# Patient Record
Sex: FEMALE | Race: BLACK OR AFRICAN AMERICAN | Employment: FULL TIME
[De-identification: names, ages, dates, MRNs, and addresses within clinical notes are randomized per-mention and may not be internally consistent; named-entity substitution may affect disease eponyms.]

---

## 2017-01-07 PROBLEM — J03.90 ACUTE TONSILLITIS: Status: ACTIVE | Noted: 2017-01-07

## 2017-01-07 PROBLEM — A41.9 SEPSIS (HCC): Status: ACTIVE | Noted: 2017-01-07

## 2017-01-09 PROBLEM — R65.10 SIRS (SYSTEMIC INFLAMMATORY RESPONSE SYNDROME) (HCC): Status: ACTIVE | Noted: 2017-01-07

## 2017-07-24 ENCOUNTER — TELEPHONE (OUTPATIENT)
Dept: CASE MANAGEMENT | Age: 57
End: 2017-07-24

## 2019-04-17 LAB
A/G RATIO: 1.3 (ref 1.1–2.2)
ALBUMIN SERPL-MCNC: 4.4 G/DL (ref 3.4–5)
ALP BLD-CCNC: 93 U/L (ref 40–129)
ALT SERPL-CCNC: 16 U/L (ref 10–40)
ANION GAP SERPL CALCULATED.3IONS-SCNC: 13 MMOL/L (ref 3–16)
AST SERPL-CCNC: 14 U/L (ref 15–37)
BILIRUB SERPL-MCNC: 0.7 MG/DL (ref 0–1)
BUN BLDV-MCNC: 17 MG/DL (ref 7–20)
C-REACTIVE PROTEIN: 16.6 MG/L (ref 0–5.1)
CALCIUM SERPL-MCNC: 9.8 MG/DL (ref 8.3–10.6)
CHLORIDE BLD-SCNC: 106 MMOL/L (ref 99–110)
CO2: 24 MMOL/L (ref 21–32)
CREAT SERPL-MCNC: 0.9 MG/DL (ref 0.6–1.1)
FERRITIN: 35.9 NG/ML (ref 15–150)
GFR AFRICAN AMERICAN: >60
GFR NON-AFRICAN AMERICAN: >60
GLOBULIN: 3.5 G/DL
GLUCOSE BLD-MCNC: 104 MG/DL (ref 70–99)
HCT VFR BLD CALC: 31.7 % (ref 36–48)
HEMOGLOBIN: 10.5 G/DL (ref 12–16)
IRON: 50 UG/DL (ref 37–145)
LIPASE: 21 U/L (ref 13–60)
MCH RBC QN AUTO: 25.8 PG (ref 26–34)
MCHC RBC AUTO-ENTMCNC: 33.2 G/DL (ref 31–36)
MCV RBC AUTO: 77.5 FL (ref 80–100)
PDW BLD-RTO: 15.5 % (ref 12.4–15.4)
PLATELET # BLD: 273 K/UL (ref 135–450)
PMV BLD AUTO: 9.3 FL (ref 5–10.5)
POTASSIUM SERPL-SCNC: 3.7 MMOL/L (ref 3.5–5.1)
RBC # BLD: 4.09 M/UL (ref 4–5.2)
SODIUM BLD-SCNC: 143 MMOL/L (ref 136–145)
TOTAL PROTEIN: 7.9 G/DL (ref 6.4–8.2)
TRANSFERRIN: 283 MG/DL (ref 200–360)
WBC # BLD: 6.5 K/UL (ref 4–11)

## 2019-04-23 LAB — HEMOGLOBIN ELECTROPHORESIS: NORMAL

## 2019-04-25 LAB — HGB ELECTROPHORESIS INTERP: NORMAL

## 2019-05-08 ENCOUNTER — ANESTHESIA EVENT (OUTPATIENT)
Dept: ENDOSCOPY | Age: 59
End: 2019-05-08
Payer: COMMERCIAL

## 2019-05-08 ENCOUNTER — HOSPITAL ENCOUNTER (OUTPATIENT)
Age: 59
Setting detail: OUTPATIENT SURGERY
Discharge: HOME | End: 2019-05-08
Attending: INTERNAL MEDICINE | Admitting: INTERNAL MEDICINE
Payer: COMMERCIAL

## 2019-05-08 ENCOUNTER — ANESTHESIA (OUTPATIENT)
Dept: ENDOSCOPY | Age: 59
End: 2019-05-08
Payer: COMMERCIAL

## 2019-05-08 VITALS
WEIGHT: 232.25 LBS | HEIGHT: 64 IN | OXYGEN SATURATION: 100 % | TEMPERATURE: 97.5 F | DIASTOLIC BLOOD PRESSURE: 79 MMHG | HEART RATE: 74 BPM | SYSTOLIC BLOOD PRESSURE: 135 MMHG | BODY MASS INDEX: 39.65 KG/M2 | RESPIRATION RATE: 18 BRPM

## 2019-05-08 VITALS
RESPIRATION RATE: 14 BRPM | OXYGEN SATURATION: 99 % | DIASTOLIC BLOOD PRESSURE: 78 MMHG | SYSTOLIC BLOOD PRESSURE: 123 MMHG

## 2019-05-08 DIAGNOSIS — D64.9 ANEMIA, UNSPECIFIED TYPE: ICD-10-CM

## 2019-05-08 PROCEDURE — 7100000011 HC PHASE II RECOVERY - ADDTL 15 MIN: Performed by: INTERNAL MEDICINE

## 2019-05-08 PROCEDURE — 3609012400 HC EGD TRANSORAL BIOPSY SINGLE/MULTIPLE: Performed by: INTERNAL MEDICINE

## 2019-05-08 PROCEDURE — 2580000003 HC RX 258: Performed by: ANESTHESIOLOGY

## 2019-05-08 PROCEDURE — 3700000001 HC ADD 15 MINUTES (ANESTHESIA): Performed by: INTERNAL MEDICINE

## 2019-05-08 PROCEDURE — 6360000002 HC RX W HCPCS: Performed by: NURSE ANESTHETIST, CERTIFIED REGISTERED

## 2019-05-08 PROCEDURE — 7100000010 HC PHASE II RECOVERY - FIRST 15 MIN: Performed by: INTERNAL MEDICINE

## 2019-05-08 PROCEDURE — 6360000002 HC RX W HCPCS: Performed by: ANESTHESIOLOGY

## 2019-05-08 PROCEDURE — 3700000000 HC ANESTHESIA ATTENDED CARE: Performed by: INTERNAL MEDICINE

## 2019-05-08 PROCEDURE — 2500000003 HC RX 250 WO HCPCS: Performed by: NURSE ANESTHETIST, CERTIFIED REGISTERED

## 2019-05-08 PROCEDURE — 88305 TISSUE EXAM BY PATHOLOGIST: CPT

## 2019-05-08 PROCEDURE — 2709999900 HC NON-CHARGEABLE SUPPLY: Performed by: INTERNAL MEDICINE

## 2019-05-08 PROCEDURE — 3609008900 HC SIGMOIDOSCOPY POLYP SNARE: Performed by: INTERNAL MEDICINE

## 2019-05-08 RX ORDER — SODIUM CHLORIDE 9 MG/ML
INJECTION, SOLUTION INTRAVENOUS CONTINUOUS
Status: DISCONTINUED | OUTPATIENT
Start: 2019-05-08 | End: 2019-05-08 | Stop reason: HOSPADM

## 2019-05-08 RX ORDER — FERROUS SULFATE 325(65) MG
325 TABLET ORAL 2 TIMES DAILY
Qty: 60 TABLET | Refills: 5
Start: 2019-05-08

## 2019-05-08 RX ORDER — SODIUM CHLORIDE 0.9 % (FLUSH) 0.9 %
10 SYRINGE (ML) INJECTION PRN
Status: DISCONTINUED | OUTPATIENT
Start: 2019-05-08 | End: 2019-05-08 | Stop reason: HOSPADM

## 2019-05-08 RX ORDER — MEPERIDINE HYDROCHLORIDE 25 MG/ML
12.5 INJECTION INTRAMUSCULAR; INTRAVENOUS; SUBCUTANEOUS EVERY 5 MIN PRN
Status: DISCONTINUED | OUTPATIENT
Start: 2019-05-08 | End: 2019-05-08 | Stop reason: HOSPADM

## 2019-05-08 RX ORDER — SODIUM CHLORIDE 0.9 % (FLUSH) 0.9 %
10 SYRINGE (ML) INJECTION EVERY 12 HOURS SCHEDULED
Status: DISCONTINUED | OUTPATIENT
Start: 2019-05-08 | End: 2019-05-08 | Stop reason: HOSPADM

## 2019-05-08 RX ORDER — ONDANSETRON 2 MG/ML
4 INJECTION INTRAMUSCULAR; INTRAVENOUS ONCE
Status: COMPLETED | OUTPATIENT
Start: 2019-05-08 | End: 2019-05-08

## 2019-05-08 RX ORDER — GLYCOPYRROLATE 0.2 MG/ML
INJECTION INTRAMUSCULAR; INTRAVENOUS PRN
Status: DISCONTINUED | OUTPATIENT
Start: 2019-05-08 | End: 2019-05-08 | Stop reason: SDUPTHER

## 2019-05-08 RX ORDER — PROPOFOL 10 MG/ML
INJECTION, EMULSION INTRAVENOUS PRN
Status: DISCONTINUED | OUTPATIENT
Start: 2019-05-08 | End: 2019-05-08 | Stop reason: SDUPTHER

## 2019-05-08 RX ORDER — OXYCODONE HYDROCHLORIDE 5 MG/1
5 TABLET ORAL
Status: DISCONTINUED | OUTPATIENT
Start: 2019-05-08 | End: 2019-05-08 | Stop reason: HOSPADM

## 2019-05-08 RX ORDER — PROMETHAZINE HYDROCHLORIDE 25 MG/ML
6.25 INJECTION, SOLUTION INTRAMUSCULAR; INTRAVENOUS
Status: DISCONTINUED | OUTPATIENT
Start: 2019-05-08 | End: 2019-05-08 | Stop reason: HOSPADM

## 2019-05-08 RX ORDER — LIDOCAINE HYDROCHLORIDE 20 MG/ML
INJECTION, SOLUTION EPIDURAL; INFILTRATION; INTRACAUDAL; PERINEURAL PRN
Status: DISCONTINUED | OUTPATIENT
Start: 2019-05-08 | End: 2019-05-08 | Stop reason: SDUPTHER

## 2019-05-08 RX ORDER — HYDRALAZINE HYDROCHLORIDE 20 MG/ML
10 INJECTION INTRAMUSCULAR; INTRAVENOUS EVERY 10 MIN PRN
Status: DISCONTINUED | OUTPATIENT
Start: 2019-05-08 | End: 2019-05-08 | Stop reason: HOSPADM

## 2019-05-08 RX ORDER — MORPHINE SULFATE 4 MG/ML
3 INJECTION, SOLUTION INTRAMUSCULAR; INTRAVENOUS
Status: ACTIVE | OUTPATIENT
Start: 2019-05-08 | End: 2019-05-08

## 2019-05-08 RX ADMIN — SODIUM CHLORIDE: 0.9 INJECTION, SOLUTION INTRAVENOUS at 11:33

## 2019-05-08 RX ADMIN — PROPOFOL 100 MG: 10 INJECTION, EMULSION INTRAVENOUS at 12:02

## 2019-05-08 RX ADMIN — LIDOCAINE HYDROCHLORIDE 25 MG: 20 INJECTION, SOLUTION EPIDURAL; INFILTRATION; INTRACAUDAL; PERINEURAL at 12:08

## 2019-05-08 RX ADMIN — PROPOFOL 50 MG: 10 INJECTION, EMULSION INTRAVENOUS at 12:27

## 2019-05-08 RX ADMIN — GLYCOPYRROLATE 0.2 MG: 0.2 INJECTION, SOLUTION INTRAMUSCULAR; INTRAVENOUS at 11:59

## 2019-05-08 RX ADMIN — PROPOFOL 50 MG: 10 INJECTION, EMULSION INTRAVENOUS at 12:21

## 2019-05-08 RX ADMIN — PROPOFOL 50 MG: 10 INJECTION, EMULSION INTRAVENOUS at 12:05

## 2019-05-08 RX ADMIN — PROPOFOL 50 MG: 10 INJECTION, EMULSION INTRAVENOUS at 12:33

## 2019-05-08 RX ADMIN — ONDANSETRON 4 MG: 2 INJECTION INTRAMUSCULAR; INTRAVENOUS at 11:46

## 2019-05-08 RX ADMIN — PROPOFOL 50 MG: 10 INJECTION, EMULSION INTRAVENOUS at 12:15

## 2019-05-08 RX ADMIN — PROPOFOL 20 MG: 10 INJECTION, EMULSION INTRAVENOUS at 12:12

## 2019-05-08 RX ADMIN — PROPOFOL 50 MG: 10 INJECTION, EMULSION INTRAVENOUS at 12:08

## 2019-05-08 RX ADMIN — LIDOCAINE HYDROCHLORIDE 50 MG: 20 INJECTION, SOLUTION EPIDURAL; INFILTRATION; INTRACAUDAL; PERINEURAL at 12:02

## 2019-05-08 RX ADMIN — PROPOFOL 50 MG: 10 INJECTION, EMULSION INTRAVENOUS at 12:24

## 2019-05-08 RX ADMIN — PROPOFOL 50 MG: 10 INJECTION, EMULSION INTRAVENOUS at 12:18

## 2019-05-08 RX ADMIN — LIDOCAINE HYDROCHLORIDE 25 MG: 20 INJECTION, SOLUTION EPIDURAL; INFILTRATION; INTRACAUDAL; PERINEURAL at 12:05

## 2019-05-08 ASSESSMENT — PAIN DESCRIPTION - LOCATION: LOCATION: ABDOMEN

## 2019-05-08 ASSESSMENT — ENCOUNTER SYMPTOMS: SHORTNESS OF BREATH: 0

## 2019-05-08 ASSESSMENT — PAIN - FUNCTIONAL ASSESSMENT
PAIN_FUNCTIONAL_ASSESSMENT: 0-10
PAIN_FUNCTIONAL_ASSESSMENT: PREVENTS OR INTERFERES SOME ACTIVE ACTIVITIES AND ADLS

## 2019-05-08 ASSESSMENT — LIFESTYLE VARIABLES: SMOKING_STATUS: 0

## 2019-05-08 ASSESSMENT — PAIN SCALES - GENERAL
PAINLEVEL_OUTOF10: 1
PAINLEVEL_OUTOF10: 0
PAINLEVEL_OUTOF10: 1

## 2019-05-08 ASSESSMENT — PAIN DESCRIPTION - PAIN TYPE: TYPE: ACUTE PAIN

## 2019-05-08 ASSESSMENT — PAIN DESCRIPTION - DESCRIPTORS: DESCRIPTORS: CRAMPING

## 2019-05-08 ASSESSMENT — PAIN DESCRIPTION - FREQUENCY: FREQUENCY: INTERMITTENT

## 2019-05-08 NOTE — H&P
Pre-operative History and Physical    Patient: Orly Burton  : 1960  Acct#:     Intended Procedure:  EGD and sigmoidoscopy    HISTORY OF PRESENT ILLNESS:  The patient is a 61 y.o. female  who presents for/due to iron deficiency anemia and rectal bleeding. Past Medical History:        Diagnosis Date    Asthma     Blood transfusion reaction     GERD (gastroesophageal reflux disease)     Headache(784.0)     HTN (hypertension), benign 5/3/2010    Hyperlipidemia     Major depression in remission ([facility] [address] 5/3/2010    Rectal bleeding      Past Surgical History:        Procedure Laterality Date    CHOLECYSTECTOMY      COLONOSCOPY      HYSTERECTOMY      SHOULDER SURGERY      2 on right 1 on left     Medications Prior to Admission:   No current facility-administered medications on file prior to encounter. Current Outpatient Medications on File Prior to Encounter   Medication Sig Dispense Refill    Probiotic Product (ALIGN PO) Take by mouth      budesonide-formoterol (SYMBICORT) 160-4.5 MCG/ACT AERO Inhale 2 puffs into the lungs 2 times daily. 1 Inhaler 3        Allergies:  Aspirin    Social History:   TOBACCO:   reports that she quit smoking about 6 years ago. Her smoking use included cigarettes. She has a 3.00 pack-year smoking history. She has never used smokeless tobacco.  ETOH:   reports that she drinks alcohol. DRUGS:   reports that she does not use drugs. PHYSICAL EXAM:      Vital Signs: BP (!) 152/82   Pulse 83   Temp 97.5 °F (36.4 °C) (Temporal)   Resp 16   Ht 5' 4\" (1.626 m)   Wt 232 lb 4 oz (105.3 kg)   SpO2 98%   BMI 39.87 kg/m²    Airway: No stridor or wheezing noted. Good air movement  Pulmonary: without wheezes.   Clear to auscultation  Cardiac:regular rate and rhythm without loud murmurs  Abdomen:soft, nontender,  Bowel sounds present    Pre-Procedure Assessment / Plan:  1) Iron deficiency anemia  2) Chronic diarrhea    ASA Grade:  ASA 3 - Patient with moderate systemic disease with functional limitations  Mallampati Classification:  Class II    Level of Sedation Plan:Deep sedation    Post Procedure plan: Return to same level of care    I assessed the patient and find that the patient is in satisfactory condition to proceed with the planned procedure and sedation plan. I have explained the risk, benefits, and alternatives to the procedure; the patient understands and agrees to proceed.        Kelby Leoncio, DO  5/8/2019

## 2019-05-08 NOTE — OP NOTE
Endoscopy Note    Patient: Tania Mcgowan  : 1960  Acct#:     Procedure: Esophagogastroduodenoscopy with biopsy                       Sigmoidoscopy with biopsy, tattoo    Date:  2019     Endoscopist:   Cherelle Cordero DO    Referring Physician:  Shannon Altamirano MD    Indications: This is a 61y.o. year old female who presents today with intermittent diarrhea and constipation, with iron deficiency anemia and [place] rectal bleeding. She has a prior history of pedunculated lipomatous appearing polyp in  and . This was previously biopsied in  and was benign. Previous Colonoscopy: YES  Date:   Greater than 3 years: N/A    Postoperative Diagnosis:  1) The Z line was at 39 cm from the incisors. The patient was noted to have a 3mm tongue of columnar epithelium lining the distal esophagus. This was closely examined with both white light and narrow band imaging. This was suspicious for Norman's esophagus. This was biopsied. No nodules or raised lesions were noted. 2) No reflux esophagitis was noted. 3) A small 5mm gastric inlet patch was noted in the proximal esophagus. 4) A small 2cm sliding hiatal hernia was noted. 5) There was a moderate amount of bile stained liquid noted in the stomach with reactive mucosal changes noted. The antrum of the stomach was biopsied. 6) The villous pattern appeared normal, but given symptoms, multiple small bowel biopsies were obtained to evaluate for celiac disease. 7) The ampulla was normal appearing. 8) There was a large 5-7 cm pedunculated polypoid lesion in the sigmoid colon. This was from 28-35 cm from the anal verge. This appeared to be somewhat necrotic on the distal aspect of the polyp. This was in the location of the previously identified suspected lipoma. This lesion was soft and friable with near total complete luminal obstruction.   This mass appeared to be friable and irritated with possible

## 2019-05-08 NOTE — ANESTHESIA PRE PROCEDURE
SURGERY      2 on right 1 on left       Social History:    Social History     Tobacco Use    Smoking status: Former Smoker     Packs/day: 0.10     Years: 30.00     Pack years: 3.00     Types: Cigarettes     Last attempt to quit: 3/12/2013     Years since quittin.1    Smokeless tobacco: Never Used    Tobacco comment: would like to try chantix   Substance Use Topics    Alcohol use: Yes     Comment: less than 1/week- Absolute                                Counseling given: Not Answered  Comment: would like to try chantix      Vital Signs (Current):   Vitals:    19 1547 19 1128   BP:  (!) 152/82   Pulse:  83   Resp:  16   Temp:  97.5 °F (36.4 °C)   TempSrc:  Temporal   SpO2:  98%   Weight: 240 lb (108.9 kg) 232 lb 4 oz (105.3 kg)   Height: 5' 4\" (1.626 m)                                               BP Readings from Last 3 Encounters:   19 (!) 152/82   01/10/17 (!) 161/85   14 126/76       NPO Status: Time of last liquid consumption: 1800                        Time of last solid consumption: 1800                        Date of last liquid consumption: 19                        Date of last solid food consumption: 19    BMI:   Wt Readings from Last 3 Encounters:   19 232 lb 4 oz (105.3 kg)   01/10/17 225 lb 14.4 oz (102.5 kg)   14 222 lb 9.6 oz (101 kg)     Body mass index is 39.87 kg/m².     CBC:   Lab Results   Component Value Date    WBC 6.5 2019    RBC 4.09 2019    HGB 10.5 2019    HCT 31.7 2019    MCV 77.5 2019    RDW 15.5 2019     2019       CMP:   Lab Results   Component Value Date     2019    K 3.7 2019     2019    CO2 24 2019    BUN 17 2019    CREATININE 0.9 2019    GFRAA >60 2019    GFRAA >60 2012    AGRATIO 1.3 2019    LABGLOM >60 2019    LABGLOM >60 2010    GLUCOSE 104 2019    PROT 7.9 2019    PROT 8.2 10/05/2012 CALCIUM 9.8 04/17/2019    BILITOT 0.7 04/17/2019    ALKPHOS 93 04/17/2019    AST 14 04/17/2019    ALT 16 04/17/2019       POC Tests: No results for input(s): POCGLU, POCNA, POCK, POCCL, POCBUN, POCHEMO, POCHCT in the last 72 hours. Coags:   Lab Results   Component Value Date    PROTIME 10.6 10/01/2012    INR 0.93 10/01/2012    APTT 31.6 10/01/2012       HCG (If Applicable): No results found for: PREGTESTUR, PREGSERUM, HCG, HCGQUANT     ABGs: No results found for: PHART, PO2ART, WMO7SVO, VWA9YOZ, BEART, U2PTZGAQ     Type & Screen (If Applicable):  No results found for: LABABO, [address]    Anesthesia Evaluation  Patient summary reviewed   history of anesthetic complications: PONV. Airway: Mallampati: II  TM distance: >3 FB     Mouth opening: > = 3 FB Dental: normal exam         Pulmonary: breath sounds clear to auscultation  (+) asthma (last MDI use 6 mo ago):     (-) shortness of breath, recent URI and not a current smoker          Patient did not smoke on day of surgery. Cardiovascular:  Exercise tolerance: good (>4 METS),   (+) hypertension (no meds DOS):,     (-) pacemaker, past MI, CABG/stent,  angina and murmur      Rhythm: regular  Rate: normal                    Neuro/Psych:               GI/Hepatic/Renal:   (+) GERD:,      (-) no renal disease       Endo/Other:        (-) diabetes mellitus, hypothyroidism               Abdominal:           Vascular:                                        Anesthesia Plan      TIVA     ASA 2       Induction: intravenous. Anesthetic plan and risks discussed with patient. Plan discussed with CRNA.                   Maryhelen Hammans, MD   5/8/2019

## 2019-05-08 NOTE — PROGRESS NOTES
5 mLSPOT ink injection at polyp at 35 cm per MD   SPOT lot [id number]  Exp 04/26/2020
you have a living will and a durable power of  for healthcare, please bring in a copy. As part of our patient safety program to minimize surgical site infections, we ask you to do the following:    · Please notify your surgeon if you develop any illness between         now and the  day of your surgery. · This includes a cough, cold, fever, sore throat, nausea,         or vomiting, and diarrhea, etc.  ·  Please notify your surgeon if you experience dizziness, shortness         of breath or blurred vision between now and the time of your surgery. You may shower the night before surgery or the morning of   your surgery with an antibacterial soap. You will need to bring a photo ID and insurance card    [facility] has an onsite pharmacy, would you like to utilize our pharmacy     If you will be staying overnight and use a C-pap machine, please bring   your C-pap to hospital     Our goal is to provide you with excellent care, therefore, visitors will be limited to two(2) in the room at a time so that we may focus on providing this care for you. Please contact pre-admission testing if you have any further questions. [facility] phone number:  [phone]  Please note these are generalized instructions for all surgical cases, you may be provided with more specific instructions according to your surgery.

## 2022-07-04 ENCOUNTER — HOSPITAL ENCOUNTER (EMERGENCY)
Age: 62
Discharge: HOME | End: 2022-07-04
Attending: EMERGENCY MEDICINE
Payer: COMMERCIAL

## 2022-07-04 ENCOUNTER — APPOINTMENT (OUTPATIENT)
Dept: GENERAL RADIOLOGY | Age: 62
End: 2022-07-04
Payer: COMMERCIAL

## 2022-07-04 ENCOUNTER — APPOINTMENT (OUTPATIENT)
Dept: CT IMAGING | Age: 62
End: 2022-07-04
Payer: COMMERCIAL

## 2022-07-04 VITALS
WEIGHT: 250 LBS | HEIGHT: 64 IN | BODY MASS INDEX: 42.68 KG/M2 | RESPIRATION RATE: 16 BRPM | OXYGEN SATURATION: 95 % | SYSTOLIC BLOOD PRESSURE: 116 MMHG | DIASTOLIC BLOOD PRESSURE: 64 MMHG | HEART RATE: 77 BPM | TEMPERATURE: 98.5 F

## 2022-07-04 DIAGNOSIS — Z20.822 CLOSE EXPOSURE TO COVID-19 VIRUS: Primary | ICD-10-CM

## 2022-07-04 DIAGNOSIS — J18.9 PNEUMONITIS: ICD-10-CM

## 2022-07-04 DIAGNOSIS — R91.1 LUNG NODULE: ICD-10-CM

## 2022-07-04 LAB
A/G RATIO: 1.4 (ref 1.1–2.2)
ALBUMIN SERPL-MCNC: 4.5 G/DL (ref 3.4–5)
ALP BLD-CCNC: 102 U/L (ref 40–129)
ALT SERPL-CCNC: 22 U/L (ref 10–40)
ANION GAP SERPL CALCULATED.3IONS-SCNC: 9 MMOL/L (ref 3–16)
AST SERPL-CCNC: 18 U/L (ref 15–37)
BASOPHILS ABSOLUTE: 0 K/UL (ref 0–0.2)
BASOPHILS RELATIVE PERCENT: 0.3 %
BILIRUB SERPL-MCNC: 0.9 MG/DL (ref 0–1)
BUN BLDV-MCNC: 15 MG/DL (ref 7–20)
CALCIUM SERPL-MCNC: 10.3 MG/DL (ref 8.3–10.6)
CHLORIDE BLD-SCNC: 107 MMOL/L (ref 99–110)
CO2: 25 MMOL/L (ref 21–32)
CREAT SERPL-MCNC: 0.7 MG/DL (ref 0.6–1.2)
D DIMER: 1.53 UG/ML FEU (ref 0–0.6)
EOSINOPHILS ABSOLUTE: 0 K/UL (ref 0–0.6)
EOSINOPHILS RELATIVE PERCENT: 0.6 %
GFR AFRICAN AMERICAN: >60
GFR NON-AFRICAN AMERICAN: >60
GLUCOSE BLD-MCNC: 115 MG/DL (ref 70–99)
HCT VFR BLD CALC: 34 % (ref 36–48)
HEMOGLOBIN: 11.1 G/DL (ref 12–16)
INFLUENZA A: NOT DETECTED
INFLUENZA B: NOT DETECTED
LYMPHOCYTES ABSOLUTE: 1.9 K/UL (ref 1–5.1)
LYMPHOCYTES RELATIVE PERCENT: 25.9 %
MCH RBC QN AUTO: 25.7 PG (ref 26–34)
MCHC RBC AUTO-ENTMCNC: 32.8 G/DL (ref 31–36)
MCV RBC AUTO: 78.4 FL (ref 80–100)
MONOCYTES ABSOLUTE: 0.5 K/UL (ref 0–1.3)
MONOCYTES RELATIVE PERCENT: 7.6 %
NEUTROPHILS ABSOLUTE: 4.7 K/UL (ref 1.7–7.7)
NEUTROPHILS RELATIVE PERCENT: 65.6 %
PDW BLD-RTO: 15.1 % (ref 12.4–15.4)
PLATELET # BLD: 218 K/UL (ref 135–450)
PMV BLD AUTO: 9.1 FL (ref 5–10.5)
POTASSIUM SERPL-SCNC: 4.2 MMOL/L (ref 3.5–5.1)
PRO-BNP: 19 PG/ML (ref 0–124)
RBC # BLD: 4.33 M/UL (ref 4–5.2)
SARS-COV-2 RNA, RT PCR: NOT DETECTED
SODIUM BLD-SCNC: 141 MMOL/L (ref 136–145)
TOTAL PROTEIN: 7.8 G/DL (ref 6.4–8.2)
TROPONIN: <0.01 NG/ML
WBC # BLD: 7.2 K/UL (ref 4–11)

## 2022-07-04 PROCEDURE — 87636 SARSCOV2 & INF A&B AMP PRB: CPT

## 2022-07-04 PROCEDURE — 85025 COMPLETE CBC W/AUTO DIFF WBC: CPT

## 2022-07-04 PROCEDURE — 94760 N-INVAS EAR/PLS OXIMETRY 1: CPT

## 2022-07-04 PROCEDURE — 85379 FIBRIN DEGRADATION QUANT: CPT

## 2022-07-04 PROCEDURE — 71046 X-RAY EXAM CHEST 2 VIEWS: CPT

## 2022-07-04 PROCEDURE — 6360000004 HC RX CONTRAST MEDICATION: Performed by: PHYSICIAN ASSISTANT

## 2022-07-04 PROCEDURE — 6370000000 HC RX 637 (ALT 250 FOR IP): Performed by: PHYSICIAN ASSISTANT

## 2022-07-04 PROCEDURE — 93005 ELECTROCARDIOGRAM TRACING: CPT | Performed by: PHYSICIAN ASSISTANT

## 2022-07-04 PROCEDURE — 94640 AIRWAY INHALATION TREATMENT: CPT

## 2022-07-04 PROCEDURE — 71260 CT THORAX DX C+: CPT | Performed by: PHYSICIAN ASSISTANT

## 2022-07-04 PROCEDURE — 80053 COMPREHEN METABOLIC PANEL: CPT

## 2022-07-04 PROCEDURE — 83880 ASSAY OF NATRIURETIC PEPTIDE: CPT

## 2022-07-04 PROCEDURE — 99285 EMERGENCY DEPT VISIT HI MDM: CPT

## 2022-07-04 PROCEDURE — 84484 ASSAY OF TROPONIN QUANT: CPT

## 2022-07-04 RX ORDER — DOXYCYCLINE 100 MG/1
100 TABLET ORAL 2 TIMES DAILY
Qty: 20 TABLET | Refills: 0 | Status: ON HOLD
Start: 2022-07-04 | End: 2022-07-11 | Stop reason: HOSPADM

## 2022-07-04 RX ORDER — IPRATROPIUM BROMIDE AND ALBUTEROL SULFATE 2.5; .5 MG/3ML; MG/3ML
1 SOLUTION RESPIRATORY (INHALATION) ONCE
Status: COMPLETED | OUTPATIENT
Start: 2022-07-04 | End: 2022-07-04

## 2022-07-04 RX ORDER — BENZONATATE 100 MG/1
100 CAPSULE ORAL 3 TIMES DAILY PRN
Qty: 30 CAPSULE | Refills: 0 | Status: SHIPPED | OUTPATIENT
Start: 2022-07-04 | End: 2022-07-11

## 2022-07-04 RX ORDER — ALBUTEROL SULFATE 2.5 MG/3ML
2.5 SOLUTION RESPIRATORY (INHALATION) EVERY 6 HOURS PRN
Qty: 120 EACH | Refills: 1 | Status: SHIPPED | OUTPATIENT
Start: 2022-07-04

## 2022-07-04 RX ADMIN — IOPAMIDOL 75 ML: 755 INJECTION, SOLUTION INTRAVENOUS at 12:22

## 2022-07-04 RX ADMIN — IPRATROPIUM BROMIDE AND ALBUTEROL SULFATE 1 AMPULE: .5; 3 SOLUTION RESPIRATORY (INHALATION) at 11:46

## 2022-07-04 ASSESSMENT — ENCOUNTER SYMPTOMS
STRIDOR: 0
WHEEZING: 0
NAUSEA: 0
TROUBLE SWALLOWING: 0
SORE THROAT: 1
COLOR CHANGE: 0
BACK PAIN: 0
VOICE CHANGE: 0
COUGH: 1
CONSTIPATION: 0
DIARRHEA: 0
SHORTNESS OF BREATH: 0
VOMITING: 0
ABDOMINAL PAIN: 0

## 2022-07-04 ASSESSMENT — PAIN - FUNCTIONAL ASSESSMENT: PAIN_FUNCTIONAL_ASSESSMENT: NONE - DENIES PAIN

## 2022-07-04 ASSESSMENT — LIFESTYLE VARIABLES
HOW MANY STANDARD DRINKS CONTAINING ALCOHOL DO YOU HAVE ON A TYPICAL DAY: 3 OR 4
HOW OFTEN DO YOU HAVE A DRINK CONTAINING ALCOHOL: 2-4 TIMES A MONTH

## 2022-07-04 NOTE — ED PROVIDER NOTES
This is an independent EDWIN patient encounter. I am available for consultation if needed. I did not perform a face-to-face evaluation of this patient and was not asked to see the patient. I was not made aware of any details of the patient's H&P or medical decision making but was asked to review and document this EKG. See my interpretation of the EKG below. I shared my findings and interpretation with the EDWIN for use in his/her independent management of this patient. See his/her note for details of the patient's history, physical, and all medical decision making.       The 12 lead EKG was interpreted by me as follows:  Rate: normal with a rate of 78  Rhythm: sinus  Axis: normal  Intervals: normal VA, narrow QRS, normal QTc  ST segments: no ST elevations or depressions  T waves: no abnormal inversions  Non-specific T wave changes: present   LVH changes  Prior EKG comparison: EKG dated 10/2/12 is not significantly different          Natalia Sanchez MD  07/04/22 1021

## 2022-07-04 NOTE — ED PROVIDER NOTES
[facility]        Pt Name: Vane Dasilva  MRN: [id number]  [place] 1960  Date of evaluation: 7/4/2022  Provider: Joss Aldrich PA-C  PCP: Ann Marie Rocha MD  Note Started: 10:56 AM EDT       EDWIN. I have evaluated this patient. My supervising physician was available for consultation. CHIEF COMPLAINT       Chief Complaint   Patient presents with    Cough     mucous with blood since thursday. feels achy  had fever       HISTORY OF PRESENT ILLNESS   (Location, Timing/Onset, Context/Setting, Quality, Duration, Modifying Factors, Severity, Associated Signs and Symptoms)  Note limiting factors. Chief Complaint: Cough, congestion, fever, chills    Vane Dasilva is a 58 y.o. female who presents to the emergency department complaining of cough, congestion, fever and chills starting on Thursday. Her  is being seen in the emergency department for similar presentation. They recently got back from [place] to visit with son, who renewed his wedding vows. The patient is coughing up frothy sputum but does occasionally report blood streaks. Denies chest pain or shortness of breath. She is wearing a heart monitor in preparation for right knee surgery on 7/18/2022. She is not experiencing any palpitations at this time. She had COVID in 2020 and had some lung and heart complications from this illness. She reports sore throat when she coughs. Nursing Notes were all reviewed and agreed with or any disagreements were addressed in the HPI. REVIEW OF SYSTEMS    (2-9 systems for level 4, 10 or more for level 5)     Review of Systems   Constitutional: Positive for chills, fatigue and fever. HENT: Positive for congestion and sore throat. Negative for dental problem, drooling, ear discharge, ear pain, tinnitus, trouble swallowing and voice change. Eyes: Negative for visual disturbance.    Respiratory: Positive for cough. Negative for shortness of breath, wheezing and stridor. Cardiovascular: Negative for chest pain, palpitations and leg swelling. Gastrointestinal: Negative for abdominal pain, constipation, diarrhea, nausea and vomiting. Genitourinary: Negative. Musculoskeletal: Positive for myalgias. Negative for back pain, neck pain and neck stiffness. Skin: Negative for color change, pallor, rash and wound. Neurological: Negative for dizziness, tremors, seizures, syncope, facial asymmetry, speech difficulty, weakness, light-headedness, numbness and headaches. Psychiatric/Behavioral: Negative for confusion. All other systems reviewed and are negative. Positives and Pertinent negatives as per HPI. Except as noted above in the ROS, all other systems were reviewed and negative. PAST MEDICAL HISTORY     Past Medical History:   Diagnosis Date    Asthma     Blood transfusion reaction     GERD (gastroesophageal reflux disease)     Headache(784.0)     HTN (hypertension), benign 5/3/2010    Hyperlipidemia     Major depression in remission ([facility] [address] 5/3/2010    Rectal bleeding          SURGICAL HISTORY     Past Surgical History:   Procedure Laterality Date    CHOLECYSTECTOMY      COLONOSCOPY      HYSTERECTOMY (CERVIX STATUS UNKNOWN)      SHOULDER SURGERY      2 on right 1 on left    SIGMOIDOSCOPY N/A 5/8/2019    SIGMOIDOSCOPY POLYP SNARE performed by Dylon Adhikari., DO at [address] [place] [address] N/A 5/8/2019    EGD BIOPSY performed by Dylon Viveros DO at [place] [address]       Previous Medications    BUDESONIDE-FORMOTEROL (SYMBICORT) 160-4.5 MCG/ACT AERO    Inhale 2 puffs into the lungs 2 times daily.     FERROUS SULFATE 325 (65 FE) MG TABLET    Take 1 tablet by mouth 2 times daily    PROBIOTIC PRODUCT (ALIGN PO)    Take by mouth         ALLERGIES     Aspirin    FAMILYHISTORY       Family History   Problem Conjunctiva/sclera: Conjunctivae normal.      Pupils: Pupils are equal, round, and reactive to light. Neck:      Trachea: Trachea and phonation normal.      Meningeal: Brudzinski's sign and Kernig's sign absent. Cardiovascular:      Rate and Rhythm: Normal rate. Pulmonary:      Effort: Pulmonary effort is normal.      Breath sounds: Normal breath sounds. Abdominal:      General: Bowel sounds are normal.      Palpations: Abdomen is soft. Tenderness: There is no abdominal tenderness. There is no right CVA tenderness or left CVA tenderness. Musculoskeletal:         General: Normal range of motion. Cervical back: Full passive range of motion without pain, normal range of motion and neck supple. Comments: No extremity edema, posterior calf or thigh tenderness, palpable cord, discoloration. Negative Homans   Lymphadenopathy:      Cervical: No cervical adenopathy. Skin:     General: Skin is warm and dry. Capillary Refill: Capillary refill takes less than 2 seconds. Coloration: Skin is not jaundiced or pale. Findings: No bruising, erythema, lesion or rash. Neurological:      General: No focal deficit present. Mental Status: She is alert and oriented to person, place, and time.    Psychiatric:         Mood and Affect: Mood normal.         Behavior: Behavior normal.         DIAGNOSTIC RESULTS   LABS:    Labs Reviewed   D-DIMER, QUANTITATIVE - Abnormal; Notable for the following components:       Result Value    D-Dimer, Quant 1.53 (*)     All other components within normal limits   CBC WITH AUTO DIFFERENTIAL - Abnormal; Notable for the following components:    Hemoglobin 11.1 (*)     Hematocrit 34.0 (*)     MCV 78.4 (*)     MCH 25.7 (*)     All other components within normal limits   COMPREHENSIVE METABOLIC PANEL - Abnormal; Notable for the following components:    Glucose 115 (*)     All other components within normal limits   COVID-19 & INFLUENZA COMBO   TROPONIN   BRAIN NATRIURETIC PEPTIDE       When ordered only abnormal lab results are displayed. All other labs were within normal range or not returned as of this dictation. EKG: When ordered, EKG's are interpreted by the Emergency Department Physician in the absence of a cardiologist.  Please see their note for interpretation of EKG. RADIOLOGY:   Non-plain film images such as CT, Ultrasound and MRI are read by the radiologist. Plain radiographic images are visualized and preliminarily interpreted by the ED Provider with the below findings:        Interpretation per the Radiologist below, if available at the time of this note:    CT CHEST PULMONARY EMBOLISM W CONTRAST   Final Result   No findings to suggest large central pulmonary embolism. New 5 mm indeterminate noncalcified right upper lobe pulmonary nodule. Additional pulmonary nodules are not markedly changed. Follow-up   recommendations are as below. Patchy linear and ground-glass bilateral lower lobe opacity, favoring   atelectasis in the absence of signs or symptoms of pneumonitis. RECOMMENDATIONS:   Fleischner Society guidelines for follow-up and management of incidentally   detected pulmonary nodules:      Single Solid Nodule:      Nodule size less than 6 mm   In a low-risk patient, no routine follow-up. In a high-risk patient, optional CT at 12 months. Nodule size equals 6-8 mm   In a low-risk patient, CT at 6-12 months, then consider CT at 18-24 months. In a high-risk patient, CT at 6-12 months, then CT at 18-24 months. Nodule size greater than 8 mm         In a low-risk patient, consider CT at 3 months, PET/CT, or tissue sampling. In a high-risk patient, consider CT at 3 months, PET/CT, or tissue sampling. Multiple Solid Nodules:      Nodule size less than 6 mm   In a low-risk patient, no routine follow-up. In a high-risk patient, optional CT at 12 months.       Nodule size equals 6-8 mm   In a low-risk patient, CT at 3-6 months, then consider CT at 18-24 months. In a high-risk patient, CT at 3-6 months, then CT at 18-24 months. Nodule size greater than 8 mm   In a low-risk patient, CT at 3-6 months, then consider CT at 18-24 months. In a high-risk patient, CT at 3-6 months, then CT at 18-24 months. - Low risk patients include individuals with minimal or absent history of   smoking and other known risk factors. - High risk patients include individuals with a history or smoking or known   risk factors. Radiology 2017 http://pubs. rsna.org/doi/full/10.1148/radiol. 6569620948         XR CHEST (2 VW)   Final Result   No acute cardiopulmonary disease. PROCEDURES   Unless otherwise noted below, none     Procedures    CRITICAL CARE TIME   n/a    CONSULTS:  None      EMERGENCY DEPARTMENT COURSE and DIFFERENTIAL DIAGNOSIS/MDM:   Vitals:    Vitals:    07/04/22 1042 07/04/22 1150   BP: 116/64    Pulse: 82 77   Resp: 16 16   Temp: 98.5 °F (36.9 °C)    SpO2: 98% 95%   Weight: 250 lb (113.4 kg)    Height: 5' 4\" (1.626 m)        Patient was given the following medications:  Medications   ipratropium-albuterol (DUONEB) nebulizer solution 1 ampule (1 ampule Inhalation Given 7/4/22 1146)   iopamidol (ISOVUE-370) 76 % injection 75 mL (75 mLs IntraVENous Given 7/4/22 1222)         Is this patient to be included in the SEP-1 Core Measure due to severe sepsis or septic shock? No   Exclusion criteria - the patient is NOT to be included for SEP-1 Core Measure due to:  Viral etiology found or highly suspected (including COVID-19) without concomitant bacterial infection    This patient presents to the emergency department complaining of respiratory symptoms and sore throat. ENT evaluation is unremarkable. Nurse had some difficulty obtaining COVID swab from this patient because she would not sit still.   However her , who is also being seen in the emergency department, has a positive COVID test and they have been in very close quarters with each other. Therefore, I have high suspicion for COVID infection in this patient. CT scan of the chest shows incidental lung nodule without evidence of PE. Patient was informed of incidental findings. Has evidence of pneumonitis, therefore will be sent home with antibiotic with medications to address her symptoms. She is not tachypneic, tachycardic or hypoxic. Sent home with pulse oximeter and advised to return if oxygen saturation drops below 90%. My suspicion is low for ACS, PE, myocarditis, pericarditis, endocarditis, acute pulmonary edema, pleural effusion, pericardial effusion, cardiac tamponade, cardiomyopathy, CHF exacerbation, thoracic aortic dissection, esophageal rupture, other life-threatening arrhythmia, hypertensive urgency or emergency, hemothorax, pulmonary contusion, subcutaneous emphysema, flail chest, pneumo mediastinum, rib fracture,  pneumothorax,  pertussis, RSV, influenza, ARDS, severe asthma exacerbation, severe COPD exacerbation, bells palsy, TMJ syndrome, trigeminal neuralgia, dental abscess, Santiago angina, otitis, acute strep pharyngitis, peritonsillar or tonsillar abscess, retropharyngeal abscess, bacterial tracheitis, epiglottitis, meningitis, encephalitis, foreign body, angioedema, anaphylaxis, mononucleosis, mumps, lymphoma, leukemia, asthma exacerbation, osteomyelitis, mastoiditis, sepsis, DKA,  sialadenitis, parotiditis, trench mouth, shingles, or other concerning pathology. FINAL IMPRESSION      1. Close exposure to COVID-19 virus    2. Pneumonitis    3.  Lung nodule          DISPOSITION/PLAN   DISPOSITION Decision To Discharge 07/04/2022 01:14:02 PM      PATIENT REFERRED TO:  Ryan Jones MD  [address] [place] [address]  [phone]    In 3 days      [facility]  [address] [place] [address]  [phone]    If symptoms worsen      DISCHARGE MEDICATIONS:  New Prescriptions    ALBUTEROL (PROVENTIL) (2.5 MG/3ML) 0.083% NEBULIZER SOLUTION    Take 3 mLs by nebulization every 6 hours as needed for Wheezing    BENZONATATE (TESSALON PERLES) 100 MG CAPSULE    Take 1 capsule by mouth 3 times daily as needed for Cough    DOXYCYCLINE MONOHYDRATE (ADOXA) 100 MG TABLET    Take 1 tablet by mouth 2 times daily for 10 days    MAGIC MOUTHWASH (MIRACLE MOUTHWASH)    Swish and spit 5 mLs 4 times daily as needed for Irritation       DISCONTINUED MEDICATIONS:  Discontinued Medications    No medications on file              (Please note that portions of this note were completed with a voice recognition program.  Efforts were made to edit the dictations but occasionally words are mis-transcribed.)    Yvette Roblero PA-C (electronically signed)           Yvette Roblero PA-C  07/04/22 Francisco Aceves PA-C  07/04/22 0774

## 2022-07-06 LAB
EKG ATRIAL RATE: 78 BPM
EKG DIAGNOSIS: NORMAL
EKG P AXIS: 46 DEGREES
EKG P-R INTERVAL: 154 MS
EKG Q-T INTERVAL: 396 MS
EKG QRS DURATION: 78 MS
EKG QTC CALCULATION (BAZETT): 451 MS
EKG R AXIS: 7 DEGREES
EKG T AXIS: 5 DEGREES
EKG VENTRICULAR RATE: 78 BPM

## 2022-07-06 PROCEDURE — 93010 ELECTROCARDIOGRAM REPORT: CPT | Performed by: INTERNAL MEDICINE

## 2022-07-08 ENCOUNTER — APPOINTMENT (OUTPATIENT)
Dept: GENERAL RADIOLOGY | Age: 62
DRG: 177 | End: 2022-07-08
Payer: COMMERCIAL

## 2022-07-08 ENCOUNTER — HOSPITAL ENCOUNTER (INPATIENT)
Age: 62
LOS: 1 days | Discharge: HOME HEALTH | DRG: 177 | End: 2022-07-11
Attending: EMERGENCY MEDICINE | Admitting: INTERNAL MEDICINE
Payer: COMMERCIAL

## 2022-07-08 DIAGNOSIS — R06.2 WHEEZING: ICD-10-CM

## 2022-07-08 DIAGNOSIS — I10 ESSENTIAL HYPERTENSION: ICD-10-CM

## 2022-07-08 DIAGNOSIS — J18.9 PNEUMONIA DUE TO INFECTIOUS ORGANISM, UNSPECIFIED LATERALITY, UNSPECIFIED PART OF LUNG: ICD-10-CM

## 2022-07-08 DIAGNOSIS — U07.1 COVID: Primary | ICD-10-CM

## 2022-07-08 PROBLEM — J12.82 PNEUMONIA DUE TO COVID-19 VIRUS: Status: ACTIVE | Noted: 2022-07-08

## 2022-07-08 LAB
A/G RATIO: 1.2 (ref 1.1–2.2)
ALBUMIN SERPL-MCNC: 4.7 G/DL (ref 3.4–5)
ALP BLD-CCNC: 109 U/L (ref 40–129)
ALT SERPL-CCNC: 24 U/L (ref 10–40)
ANION GAP SERPL CALCULATED.3IONS-SCNC: 9 MMOL/L (ref 3–16)
AST SERPL-CCNC: 21 U/L (ref 15–37)
BASOPHILS ABSOLUTE: 0.1 K/UL (ref 0–0.2)
BASOPHILS RELATIVE PERCENT: 1 %
BILIRUB SERPL-MCNC: 0.6 MG/DL (ref 0–1)
BUN BLDV-MCNC: 15 MG/DL (ref 7–20)
CALCIUM SERPL-MCNC: 9.9 MG/DL (ref 8.3–10.6)
CHLORIDE BLD-SCNC: 108 MMOL/L (ref 99–110)
CO2: 30 MMOL/L (ref 21–32)
CREAT SERPL-MCNC: 0.8 MG/DL (ref 0.6–1.2)
EOSINOPHILS ABSOLUTE: 0.1 K/UL (ref 0–0.6)
EOSINOPHILS RELATIVE PERCENT: 1.6 %
GFR AFRICAN AMERICAN: >60
GFR NON-AFRICAN AMERICAN: >60
GLUCOSE BLD-MCNC: 120 MG/DL (ref 70–99)
HCT VFR BLD CALC: 33.9 % (ref 36–48)
HEMOGLOBIN: 11.3 G/DL (ref 12–16)
INFLUENZA A: NOT DETECTED
INFLUENZA B: NOT DETECTED
LYMPHOCYTES ABSOLUTE: 2.1 K/UL (ref 1–5.1)
LYMPHOCYTES RELATIVE PERCENT: 25 %
MAGNESIUM: 2.6 MG/DL (ref 1.8–2.4)
MCH RBC QN AUTO: 26.1 PG (ref 26–34)
MCHC RBC AUTO-ENTMCNC: 33.3 G/DL (ref 31–36)
MCV RBC AUTO: 78.5 FL (ref 80–100)
MONOCYTES ABSOLUTE: 0.5 K/UL (ref 0–1.3)
MONOCYTES RELATIVE PERCENT: 6.5 %
NEUTROPHILS ABSOLUTE: 5.4 K/UL (ref 1.7–7.7)
NEUTROPHILS RELATIVE PERCENT: 65.9 %
PDW BLD-RTO: 15.3 % (ref 12.4–15.4)
PLATELET # BLD: 289 K/UL (ref 135–450)
PMV BLD AUTO: 8.9 FL (ref 5–10.5)
POTASSIUM REFLEX MAGNESIUM: 3.5 MMOL/L (ref 3.5–5.1)
PRO-BNP: 23 PG/ML (ref 0–124)
PROCALCITONIN: 0.07 NG/ML (ref 0–0.15)
RBC # BLD: 4.32 M/UL (ref 4–5.2)
SARS-COV-2 RNA, RT PCR: DETECTED
SODIUM BLD-SCNC: 147 MMOL/L (ref 136–145)
TOTAL PROTEIN: 8.7 G/DL (ref 6.4–8.2)
TROPONIN: <0.01 NG/ML
WBC # BLD: 8.2 K/UL (ref 4–11)

## 2022-07-08 PROCEDURE — 83735 ASSAY OF MAGNESIUM: CPT

## 2022-07-08 PROCEDURE — 93005 ELECTROCARDIOGRAM TRACING: CPT | Performed by: EMERGENCY MEDICINE

## 2022-07-08 PROCEDURE — 6370000000 HC RX 637 (ALT 250 FOR IP): Performed by: EMERGENCY MEDICINE

## 2022-07-08 PROCEDURE — 83880 ASSAY OF NATRIURETIC PEPTIDE: CPT

## 2022-07-08 PROCEDURE — 6360000002 HC RX W HCPCS: Performed by: EMERGENCY MEDICINE

## 2022-07-08 PROCEDURE — 96374 THER/PROPH/DIAG INJ IV PUSH: CPT

## 2022-07-08 PROCEDURE — 94760 N-INVAS EAR/PLS OXIMETRY 1: CPT

## 2022-07-08 PROCEDURE — 80053 COMPREHEN METABOLIC PANEL: CPT

## 2022-07-08 PROCEDURE — 36415 COLL VENOUS BLD VENIPUNCTURE: CPT

## 2022-07-08 PROCEDURE — G0378 HOSPITAL OBSERVATION PER HR: HCPCS

## 2022-07-08 PROCEDURE — 71045 X-RAY EXAM CHEST 1 VIEW: CPT

## 2022-07-08 PROCEDURE — 99285 EMERGENCY DEPT VISIT HI MDM: CPT

## 2022-07-08 PROCEDURE — 85025 COMPLETE CBC W/AUTO DIFF WBC: CPT

## 2022-07-08 PROCEDURE — 87636 SARSCOV2 & INF A&B AMP PRB: CPT

## 2022-07-08 PROCEDURE — 96375 TX/PRO/DX INJ NEW DRUG ADDON: CPT

## 2022-07-08 PROCEDURE — 84484 ASSAY OF TROPONIN QUANT: CPT

## 2022-07-08 PROCEDURE — 84145 PROCALCITONIN (PCT): CPT

## 2022-07-08 PROCEDURE — 94640 AIRWAY INHALATION TREATMENT: CPT

## 2022-07-08 RX ORDER — IPRATROPIUM BROMIDE AND ALBUTEROL SULFATE 2.5; .5 MG/3ML; MG/3ML
3 SOLUTION RESPIRATORY (INHALATION) ONCE
Status: COMPLETED | OUTPATIENT
Start: 2022-07-08 | End: 2022-07-08

## 2022-07-08 RX ORDER — ACETAMINOPHEN 325 MG/1
650 TABLET ORAL EVERY 6 HOURS PRN
Status: DISCONTINUED | OUTPATIENT
Start: 2022-07-08 | End: 2022-07-11 | Stop reason: HOSPADM

## 2022-07-08 RX ORDER — ACETAMINOPHEN 650 MG/1
650 SUPPOSITORY RECTAL EVERY 6 HOURS PRN
Status: DISCONTINUED | OUTPATIENT
Start: 2022-07-08 | End: 2022-07-11 | Stop reason: HOSPADM

## 2022-07-08 RX ORDER — METHYLPREDNISOLONE SODIUM SUCCINATE 125 MG/2ML
125 INJECTION, POWDER, LYOPHILIZED, FOR SOLUTION INTRAMUSCULAR; INTRAVENOUS ONCE
Status: COMPLETED | OUTPATIENT
Start: 2022-07-08 | End: 2022-07-08

## 2022-07-08 RX ADMIN — IPRATROPIUM BROMIDE AND ALBUTEROL SULFATE 3 AMPULE: 2.5; .5 SOLUTION RESPIRATORY (INHALATION) at 23:08

## 2022-07-08 RX ADMIN — METHYLPREDNISOLONE SODIUM SUCCINATE 125 MG: 125 INJECTION, POWDER, FOR SOLUTION INTRAMUSCULAR; INTRAVENOUS at 22:55

## 2022-07-08 ASSESSMENT — ENCOUNTER SYMPTOMS
GASTROINTESTINAL NEGATIVE: 1
SHORTNESS OF BREATH: 1
COUGH: 1

## 2022-07-09 PROBLEM — U07.1 COVID-19: Status: ACTIVE | Noted: 2022-07-09

## 2022-07-09 LAB
A/G RATIO: 1.2 (ref 1.1–2.2)
ALBUMIN SERPL-MCNC: 4.9 G/DL (ref 3.4–5)
ALP BLD-CCNC: 112 U/L (ref 40–129)
ALT SERPL-CCNC: 25 U/L (ref 10–40)
ANION GAP SERPL CALCULATED.3IONS-SCNC: 10 MMOL/L (ref 3–16)
APTT: 26.5 SEC (ref 23–34.3)
AST SERPL-CCNC: 24 U/L (ref 15–37)
BASOPHILS ABSOLUTE: 0 K/UL (ref 0–0.2)
BASOPHILS RELATIVE PERCENT: 0.2 %
BILIRUB SERPL-MCNC: 0.7 MG/DL (ref 0–1)
BUN BLDV-MCNC: 14 MG/DL (ref 7–20)
C-REACTIVE PROTEIN: 13.6 MG/L (ref 0–5.1)
CALCIUM SERPL-MCNC: 10.1 MG/DL (ref 8.3–10.6)
CHLORIDE BLD-SCNC: 100 MMOL/L (ref 99–110)
CO2: 23 MMOL/L (ref 21–32)
CREAT SERPL-MCNC: 0.7 MG/DL (ref 0.6–1.2)
D DIMER: 1.39 UG/ML FEU (ref 0–0.6)
EKG ATRIAL RATE: 86 BPM
EKG DIAGNOSIS: NORMAL
EKG P AXIS: 45 DEGREES
EKG P-R INTERVAL: 150 MS
EKG Q-T INTERVAL: 398 MS
EKG QRS DURATION: 86 MS
EKG QTC CALCULATION (BAZETT): 476 MS
EKG R AXIS: 7 DEGREES
EKG T AXIS: 8 DEGREES
EKG VENTRICULAR RATE: 86 BPM
EOSINOPHILS ABSOLUTE: 0 K/UL (ref 0–0.6)
EOSINOPHILS RELATIVE PERCENT: 0 %
FERRITIN: 171.1 NG/ML (ref 15–150)
FIBRINOGEN: 533 MG/DL (ref 207–509)
GFR AFRICAN AMERICAN: >60
GFR NON-AFRICAN AMERICAN: >60
GLUCOSE BLD-MCNC: 192 MG/DL (ref 70–99)
HCT VFR BLD CALC: 36.5 % (ref 36–48)
HEMOGLOBIN: 11.7 G/DL (ref 12–16)
INR BLD: 1.02 (ref 0.87–1.14)
LACTATE DEHYDROGENASE: 394 U/L (ref 100–190)
LACTIC ACID: 2.8 MMOL/L (ref 0.4–2)
LACTIC ACID: 3.9 MMOL/L (ref 0.4–2)
LYMPHOCYTES ABSOLUTE: 0.9 K/UL (ref 1–5.1)
LYMPHOCYTES RELATIVE PERCENT: 8.7 %
MCH RBC QN AUTO: 25.7 PG (ref 26–34)
MCHC RBC AUTO-ENTMCNC: 32.2 G/DL (ref 31–36)
MCV RBC AUTO: 79.8 FL (ref 80–100)
MONOCYTES ABSOLUTE: 0.1 K/UL (ref 0–1.3)
MONOCYTES RELATIVE PERCENT: 1.1 %
NEUTROPHILS ABSOLUTE: 9.6 K/UL (ref 1.7–7.7)
NEUTROPHILS RELATIVE PERCENT: 90 %
PDW BLD-RTO: 15.2 % (ref 12.4–15.4)
PLATELET # BLD: 305 K/UL (ref 135–450)
PMV BLD AUTO: 8.9 FL (ref 5–10.5)
POTASSIUM REFLEX MAGNESIUM: 4.4 MMOL/L (ref 3.5–5.1)
PROCALCITONIN: 0.06 NG/ML (ref 0–0.15)
PROTHROMBIN TIME: 13.3 SEC (ref 11.7–14.5)
RBC # BLD: 4.57 M/UL (ref 4–5.2)
SODIUM BLD-SCNC: 133 MMOL/L (ref 136–145)
TOTAL PROTEIN: 9.1 G/DL (ref 6.4–8.2)
VITAMIN D 25-HYDROXY: 50.1 NG/ML
WBC # BLD: 10.7 K/UL (ref 4–11)

## 2022-07-09 PROCEDURE — 85025 COMPLETE CBC W/AUTO DIFF WBC: CPT

## 2022-07-09 PROCEDURE — 87040 BLOOD CULTURE FOR BACTERIA: CPT

## 2022-07-09 PROCEDURE — 85610 PROTHROMBIN TIME: CPT

## 2022-07-09 PROCEDURE — 85379 FIBRIN DEGRADATION QUANT: CPT

## 2022-07-09 PROCEDURE — 94761 N-INVAS EAR/PLS OXIMETRY MLT: CPT

## 2022-07-09 PROCEDURE — 36415 COLL VENOUS BLD VENIPUNCTURE: CPT

## 2022-07-09 PROCEDURE — 6370000000 HC RX 637 (ALT 250 FOR IP): Performed by: NURSE PRACTITIONER

## 2022-07-09 PROCEDURE — G0378 HOSPITAL OBSERVATION PER HR: HCPCS

## 2022-07-09 PROCEDURE — 6360000002 HC RX W HCPCS: Performed by: NURSE PRACTITIONER

## 2022-07-09 PROCEDURE — 85384 FIBRINOGEN ACTIVITY: CPT

## 2022-07-09 PROCEDURE — 82306 VITAMIN D 25 HYDROXY: CPT

## 2022-07-09 PROCEDURE — 2580000003 HC RX 258: Performed by: INTERNAL MEDICINE

## 2022-07-09 PROCEDURE — 96375 TX/PRO/DX INJ NEW DRUG ADDON: CPT

## 2022-07-09 PROCEDURE — 83605 ASSAY OF LACTIC ACID: CPT

## 2022-07-09 PROCEDURE — 96365 THER/PROPH/DIAG IV INF INIT: CPT

## 2022-07-09 PROCEDURE — 85730 THROMBOPLASTIN TIME PARTIAL: CPT

## 2022-07-09 PROCEDURE — 93010 ELECTROCARDIOGRAM REPORT: CPT | Performed by: INTERNAL MEDICINE

## 2022-07-09 PROCEDURE — 82728 ASSAY OF FERRITIN: CPT

## 2022-07-09 PROCEDURE — 2580000003 HC RX 258: Performed by: NURSE PRACTITIONER

## 2022-07-09 PROCEDURE — 84145 PROCALCITONIN (PCT): CPT

## 2022-07-09 PROCEDURE — 83615 LACTATE (LD) (LDH) ENZYME: CPT

## 2022-07-09 PROCEDURE — 2700000000 HC OXYGEN THERAPY PER DAY

## 2022-07-09 PROCEDURE — 6360000002 HC RX W HCPCS: Performed by: INTERNAL MEDICINE

## 2022-07-09 PROCEDURE — 86140 C-REACTIVE PROTEIN: CPT

## 2022-07-09 PROCEDURE — 94640 AIRWAY INHALATION TREATMENT: CPT

## 2022-07-09 PROCEDURE — 80053 COMPREHEN METABOLIC PANEL: CPT

## 2022-07-09 RX ORDER — ONDANSETRON 4 MG/1
4 TABLET, ORALLY DISINTEGRATING ORAL EVERY 8 HOURS PRN
Status: DISCONTINUED | OUTPATIENT
Start: 2022-07-09 | End: 2022-07-11 | Stop reason: HOSPADM

## 2022-07-09 RX ORDER — BENZONATATE 100 MG/1
100 CAPSULE ORAL 3 TIMES DAILY PRN
Status: DISCONTINUED | OUTPATIENT
Start: 2022-07-09 | End: 2022-07-11 | Stop reason: HOSPADM

## 2022-07-09 RX ORDER — 0.9 % SODIUM CHLORIDE 0.9 %
1000 INTRAVENOUS SOLUTION INTRAVENOUS ONCE
Status: COMPLETED | OUTPATIENT
Start: 2022-07-09 | End: 2022-07-09

## 2022-07-09 RX ORDER — SODIUM CHLORIDE 9 MG/ML
INJECTION, SOLUTION INTRAVENOUS CONTINUOUS
Status: DISCONTINUED | OUTPATIENT
Start: 2022-07-09 | End: 2022-07-09

## 2022-07-09 RX ORDER — PREDNISONE 20 MG/1
40 TABLET ORAL DAILY
Status: DISCONTINUED | OUTPATIENT
Start: 2022-07-09 | End: 2022-07-09

## 2022-07-09 RX ORDER — SODIUM CHLORIDE 0.9 % (FLUSH) 0.9 %
5-40 SYRINGE (ML) INJECTION EVERY 12 HOURS SCHEDULED
Status: DISCONTINUED | OUTPATIENT
Start: 2022-07-09 | End: 2022-07-11 | Stop reason: HOSPADM

## 2022-07-09 RX ORDER — ALBUTEROL SULFATE 2.5 MG/3ML
2.5 SOLUTION RESPIRATORY (INHALATION) EVERY 6 HOURS PRN
Status: DISCONTINUED | OUTPATIENT
Start: 2022-07-09 | End: 2022-07-09

## 2022-07-09 RX ORDER — ONDANSETRON 2 MG/ML
4 INJECTION INTRAMUSCULAR; INTRAVENOUS EVERY 6 HOURS PRN
Status: DISCONTINUED | OUTPATIENT
Start: 2022-07-09 | End: 2022-07-11 | Stop reason: HOSPADM

## 2022-07-09 RX ORDER — FLUTICASONE FUROATE AND VILANTEROL 100; 25 UG/1; UG/1
1 POWDER RESPIRATORY (INHALATION) DAILY
Status: ON HOLD | COMMUNITY
End: 2022-07-11 | Stop reason: HOSPADM

## 2022-07-09 RX ORDER — AZILSARTAN KAMEDOXOMIL 40 MG/1
40 TABLET ORAL DAILY
COMMUNITY

## 2022-07-09 RX ORDER — DEXAMETHASONE SODIUM PHOSPHATE 10 MG/ML
6 INJECTION, SOLUTION INTRAMUSCULAR; INTRAVENOUS DAILY
Status: DISCONTINUED | OUTPATIENT
Start: 2022-07-09 | End: 2022-07-11

## 2022-07-09 RX ORDER — SODIUM CHLORIDE 9 MG/ML
INJECTION, SOLUTION INTRAVENOUS PRN
Status: DISCONTINUED | OUTPATIENT
Start: 2022-07-09 | End: 2022-07-11 | Stop reason: HOSPADM

## 2022-07-09 RX ORDER — SODIUM CHLORIDE, SODIUM LACTATE, POTASSIUM CHLORIDE, CALCIUM CHLORIDE 600; 310; 30; 20 MG/100ML; MG/100ML; MG/100ML; MG/100ML
INJECTION, SOLUTION INTRAVENOUS CONTINUOUS
Status: DISCONTINUED | OUTPATIENT
Start: 2022-07-09 | End: 2022-07-11

## 2022-07-09 RX ORDER — POLYETHYLENE GLYCOL 3350 17 G/17G
17 POWDER, FOR SOLUTION ORAL DAILY PRN
Status: DISCONTINUED | OUTPATIENT
Start: 2022-07-09 | End: 2022-07-11 | Stop reason: HOSPADM

## 2022-07-09 RX ORDER — FUROSEMIDE 20 MG/1
20 TABLET ORAL DAILY
COMMUNITY

## 2022-07-09 RX ORDER — SODIUM CHLORIDE 0.9 % (FLUSH) 0.9 %
5-40 SYRINGE (ML) INJECTION PRN
Status: DISCONTINUED | OUTPATIENT
Start: 2022-07-09 | End: 2022-07-11 | Stop reason: HOSPADM

## 2022-07-09 RX ORDER — ENOXAPARIN SODIUM 100 MG/ML
30 INJECTION SUBCUTANEOUS 2 TIMES DAILY
Status: DISCONTINUED | OUTPATIENT
Start: 2022-07-09 | End: 2022-07-09 | Stop reason: DRUGHIGH

## 2022-07-09 RX ORDER — ENOXAPARIN SODIUM 100 MG/ML
40 INJECTION SUBCUTANEOUS 2 TIMES DAILY
Status: DISCONTINUED | OUTPATIENT
Start: 2022-07-09 | End: 2022-07-11 | Stop reason: HOSPADM

## 2022-07-09 RX ADMIN — SODIUM CHLORIDE, POTASSIUM CHLORIDE, SODIUM LACTATE AND CALCIUM CHLORIDE: 600; 310; 30; 20 INJECTION, SOLUTION INTRAVENOUS at 15:04

## 2022-07-09 RX ADMIN — BENZONATATE 100 MG: 100 CAPSULE ORAL at 20:52

## 2022-07-09 RX ADMIN — Medication 1000 MG: at 03:14

## 2022-07-09 RX ADMIN — ACETAMINOPHEN 650 MG: 325 TABLET ORAL at 20:53

## 2022-07-09 RX ADMIN — SODIUM CHLORIDE, PRESERVATIVE FREE 10 ML: 5 INJECTION INTRAVENOUS at 08:55

## 2022-07-09 RX ADMIN — SODIUM CHLORIDE 1000 ML: 9 INJECTION, SOLUTION INTRAVENOUS at 11:56

## 2022-07-09 RX ADMIN — ACETAMINOPHEN 650 MG: 325 TABLET ORAL at 12:02

## 2022-07-09 RX ADMIN — SODIUM CHLORIDE, POTASSIUM CHLORIDE, SODIUM LACTATE AND CALCIUM CHLORIDE: 600; 310; 30; 20 INJECTION, SOLUTION INTRAVENOUS at 03:21

## 2022-07-09 RX ADMIN — Medication 2 PUFF: at 21:43

## 2022-07-09 RX ADMIN — ACETAMINOPHEN 650 MG: 325 TABLET ORAL at 00:18

## 2022-07-09 RX ADMIN — Medication 2 PUFF: at 08:43

## 2022-07-09 RX ADMIN — AZITHROMYCIN MONOHYDRATE 500 MG: 500 INJECTION, POWDER, LYOPHILIZED, FOR SOLUTION INTRAVENOUS at 03:28

## 2022-07-09 RX ADMIN — DEXAMETHASONE SODIUM PHOSPHATE 6 MG: 10 INJECTION, SOLUTION INTRAMUSCULAR; INTRAVENOUS at 08:54

## 2022-07-09 ASSESSMENT — PAIN SCALES - GENERAL: PAINLEVEL_OUTOF10: 7

## 2022-07-09 ASSESSMENT — PAIN DESCRIPTION - LOCATION: LOCATION: HEAD

## 2022-07-09 NOTE — ED NOTES
Attempted to draw 2 sets of blood cultures on pt and pt refused since she is scared of needles and does not like to get poked. Stated to pt she needs to get two additional draws for 2 sets of blood cultures per MD's request. Stated to pt MD has explained to pt reasons for all test and imaging. Pt still refused and requested to speak with MD again.  Charge, RN aware     [facility]  07/08/22 5964

## 2022-07-09 NOTE — PLAN OF CARE
Problem: Safety - Adult  Goal: Free from fall injury  7/9/2022 1231 by Jennifer Francois RN  Outcome: Progressing     Problem: ABCDS Injury Assessment  Goal: Absence of physical injury  Outcome: Progressing

## 2022-07-09 NOTE — PROGRESS NOTES
Patient A&Ox4. VSS with exception to an elevated BP. Patient does have dyspnea with exertion but O2 remains above 90% on RA. Patient reporting a headache, medicated per [facility] with relief. Patient ambulating with standby assist, tolerating fairly well, reports feeling lightheaded intermittently. Patient instructed to call out for needs.

## 2022-07-09 NOTE — H&P
HOSPITALISTS HISTORY AND PHYSICAL    7/8/2022 11:07 PM    Patient Information:  Rubi Krishnan is a 58 y.o. female [id number]  PCP:  Tabatha Ward MD (Tel: [phone] )    Chief complaint:    Chief Complaint   Patient presents with    Shortness of Breath     Patient states she has been sob since the 4th of July. Patient states she was dx with pneumonia but is not improving. History of Present Illness:  Rebekah Wogn is a 58 y.o. female with hx HTN, asthma, GERD, and HLD. Patient presented to the emergency room for increasing shortness of breath. Patient recently returned from [facility] on vacation. She states on Thursday she started having a headache tired flulike symptoms they returned home on Friday from [facility] both her and her  were having similar symptoms. Over the weekend they were both very tired and slept she reports cough as well as sore throat and congestion. Both evaluated in the emergency room on July 4. Difficulty obtaining a COVID test on her however her  did test positive. She was discharged home on doxycycline and albuterol nebs. She states she did not have machine to do the albuterol treatments. She was taking the doxycycline she is on day 4 of antibiotics. Denies any fevers but still has chills. She is on RA and in no distress. She did test positive today in ER for COVID    Which she is scheduled for right knee surgery on 7/18/2022. She is questioning if she needs to reschedule. She apparently is suppose to be getting a stress test prior to procedure. History obtained from patient       REVIEW OF SYSTEMS:   Review of Systems   Constitutional: Positive for appetite change, chills and fatigue. HENT: Positive for congestion. Respiratory: Positive for cough and shortness of breath. Cardiovascular: Negative for chest pain. Gastrointestinal: Negative. Genitourinary: Negative. Musculoskeletal: Negative. Skin: Negative. Neurological: Negative. Hematological: Negative. Psychiatric/Behavioral: Negative. Past Medical History:   has a past medical history of Asthma, Blood transfusion reaction, GERD (gastroesophageal reflux disease), Headache(784.0), HTN (hypertension), benign, Hyperlipidemia, Major depression in remission ([address] and Rectal bleeding. Past Surgical History:   has a past surgical history that includes Cholecystectomy; Hysterectomy; Colonoscopy; shoulder surgery; sigmoidoscopy (N/A, 5/8/2019); and Upper gastrointestinal endoscopy (N/A, 5/8/2019). Medications:  No current facility-administered medications on file prior to encounter. Current Outpatient Medications on File Prior to Encounter   Medication Sig Dispense Refill    doxycycline monohydrate (ADOXA) 100 MG tablet Take 1 tablet by mouth 2 times daily for 10 days 20 tablet 0    albuterol (PROVENTIL) (2.5 MG/3ML) 0.083% nebulizer solution Take 3 mLs by nebulization every 6 hours as needed for Wheezing (Patient not taking: Reported on 7/8/2022) 120 each 1    benzonatate (TESSALON PERLES) 100 MG capsule Take 1 capsule by mouth 3 times daily as needed for Cough 30 capsule 0    Magic Mouthwash (MIRACLE MOUTHWASH) Swish and spit 5 mLs 4 times daily as needed for Irritation (Patient not taking: Reported on 7/8/2022) 240 mL 0    ferrous sulfate 325 (65 Fe) MG tablet Take 1 tablet by mouth 2 times daily 60 tablet 5    Probiotic Product (ALIGN PO) Take by mouth (Patient not taking: Reported on 7/8/2022)      budesonide-formoterol (SYMBICORT) 160-4.5 MCG/ACT AERO Inhale 2 puffs into the lungs 2 times daily. 1 Inhaler 3       Allergies: Allergies   Allergen Reactions    Aspirin Itching        Social History:  Patient Lives    reports that she quit smoking about 9 years ago. Her smoking use included cigarettes.  She has a 3.00 pack-year smoking history. She has never used smokeless tobacco. She reports current alcohol use. She reports current drug use. Drug: Other-see comments. Family History:  family history includes Cancer in her father and mother. ,     Physical Exam:  BP (!) 162/77   Pulse 86   Temp 98.2 °F (36.8 °C) (Oral)   Resp 17   Ht 5' 4\" (1.626 m)   Wt 252 lb 3.2 oz (114.4 kg)   SpO2 97%   BMI 43.29 kg/m²   Physical Exam  Vitals and nursing note reviewed. Constitutional:       General: She is not in acute distress. Appearance: Normal appearance. She is not ill-appearing. HENT:      Head: Normocephalic. Nose: Nose normal.      Mouth/Throat:      Mouth: Mucous membranes are moist.   Eyes:      Pupils: Pupils are equal, round, and reactive to light. Cardiovascular:      Rate and Rhythm: Normal rate and regular rhythm. Pulses: Normal pulses. Heart sounds: No murmur heard. Pulmonary:      Effort: Pulmonary effort is normal. No respiratory distress. Breath sounds: Normal breath sounds. Abdominal:      General: Abdomen is flat. Bowel sounds are normal. There is no distension. Palpations: Abdomen is soft. Tenderness: There is no abdominal tenderness. Musculoskeletal:         General: Normal range of motion. Right lower leg: No edema. Left lower leg: No edema. Skin:     General: Skin is warm and dry. Capillary Refill: Capillary refill takes less than 2 seconds. Coloration: Skin is not jaundiced. Neurological:      General: No focal deficit present. Mental Status: She is alert and oriented to person, place, and time. Cranial Nerves: No cranial nerve deficit. Psychiatric:         Mood and Affect: Mood normal.         Behavior: Behavior normal.         Thought Content:  Thought content normal.         Labs:  CBC:   Lab Results   Component Value Date/Time    WBC 8.2 07/08/2022 08:49 PM    RBC 4.32 07/08/2022 08:49 PM    HGB 11.3 07/08/2022 08:49 PM    HCT 33.9 07/08/2022 08:49 PM    MCV 78.5 07/08/2022 08:49 PM    MCH 26.1 07/08/2022 08:49 PM    MCHC 33.3 07/08/2022 08:49 PM    RDW 15.3 07/08/2022 08:49 PM     07/08/2022 08:49 PM    MPV 8.9 07/08/2022 08:49 PM     BMP:    Lab Results   Component Value Date/Time     07/08/2022 08:49 PM    K 3.5 07/08/2022 08:49 PM     07/08/2022 08:49 PM    CO2 30 07/08/2022 08:49 PM    BUN 15 07/08/2022 08:49 PM    CREATININE 0.8 07/08/2022 08:49 PM    CALCIUM 9.9 07/08/2022 08:49 PM    GFRAA >60 07/08/2022 08:49 PM    GFRAA >60 12/21/2012 12:00 AM    LABGLOM >60 07/08/2022 08:49 PM    LABGLOM >60 11/22/2010 10:31 AM    GLUCOSE 120 07/08/2022 08:49 PM     XR CHEST PORTABLE   Final Result   Stable borderline cardiomegaly with mild central pulmonary congestion or   pulmonary artery hypertension which is more apparent. Mild bibasilar atelectasis or early infiltrates which is more prominent. Chest Xray:   EKG:    I visualized CXR images and EKG strips    Problem List  Active Problems:    * No active hospital problems. *  Resolved Problems:    * No resolved hospital problems. *        Assessment/Plan:   1. COVID 19  Admit observation with telemetry  Patient has failed outpatient treatment. Unable to get her nebulizer treatments. Order nebs  She is outside window for paxlovid, symptoms started 8 days ago. She is not hypoxic, she was given IV steroids in ER. Although she does not meet criteria for steroids for COVID, she does have a hx of asthma which maybe contributing to her not improving so will continue oral daily steroids. There was concern for early infiltrate on xray. She is on day 4 of doxycyline. Will give IV rocephin and azithromycin. Daytime hospitalist to determine need for further antibiotics. Labs in am  She has had 2 vaccines for COVID but overdue for booster    2. Asthma  Continue nebs and inhalers. Oral steroids    3.  Hypertension  BP initially elevated in ER but adjusted cuff and BP stable. Continue home meds    4. Obesity  Weight managment      DVT prophylaxis Lovenox  Code status Full   Diet Regular  IV access peripheral   Perrin Catheter none    Admit as Observation. I anticipate hospitalization spanning less than two midnights for investigation and treatment of the above medically necessary diagnoses. Please note that some part of this chart was generated using Dragon dictation software. Although every effort was made to ensure the accuracy of this automated transcription, some errors in transcription may have occurred inadvertently. If you may need any clarification, please do not hesitate to contact me through [facility].        CHRISTIANO Krishnamurthy - CNP    7/8/2022 11:07 PM

## 2022-07-09 NOTE — PROGRESS NOTES
4 Eyes Skin Assessment     NAME:  Jamee Lancaster  YOB: 1960  MEDICAL RECORD NUMBER:  [id number]    The patient is being assess for  Admission    I agree that 2 RN's have performed a thorough Head to Toe Skin Assessment on the patient. ALL assessment sites listed below have been assessed. Areas assessed by both nurses:    Head, Face, Ears, Shoulders, Back, Chest, Arms, Elbows, Hands, Sacrum. Buttock, Coccyx, Ischium and Legs. Feet and Heels        Does the Patient have a Wound?  No noted wound(s)       Espinoza Prevention initiated:  NA   Wound Care Orders initiated:  NA    Pressure Injury (Stage 3,4, Unstageable, DTI, NWPT, and Complex wounds) if present place referral/consult order under [de-identified] NA    New and Established Ostomies if present place consult order under : NA      Nurse 1 eSignature: Electronically signed by Abelardo Khan RN on 7/9/22 at 2:37 AM EDT    **SHARE this note so that the co-signing nurse is able to place an eSignature**    Nurse 2 eSignature: Electronically signed by Javier Marcelino RN on 7/9/22 at 3:49 AM EDT

## 2022-07-09 NOTE — ED PROVIDER NOTES
[facility] Emergency Department    Gisela Noriega MD, am the primary clinician of record. CHIEF COMPLAINT  Chief Complaint   Patient presents with    Shortness of Breath     Patient states she has been sob since the 4th of July. Patient states she was dx with pneumonia but is not improving. HISTORY OF PRESENT ILLNESS  Gaurang Salazar is a 58 y.o. female  who presents to the ED complaining of cough and SOB. Seen here 7/4/22 for similar sx, CTPA then showed bilateral GGO but no PE, significant other had COVID but pt tested negative, and prescribed doxycycline, tessalon, and albuterol nebulizer liquid though pt states does not have a machine to use this. No fevers. Cough produces yellowish green dark sputum. Chest hurts mainly from coughing spells. H/o asthma. No belly pain vomiting or diarrhea. No other complaints, modifying factors or associated symptoms. I have reviewed the following from the nursing documentation.     Past Medical History:   Diagnosis Date    Asthma     Blood transfusion reaction     GERD (gastroesophageal reflux disease)     Headache(784.0)     HTN (hypertension), benign 5/3/2010    Hyperlipidemia     Major depression in remission ([address] 5/3/2010    Rectal bleeding      Past Surgical History:   Procedure Laterality Date    CHOLECYSTECTOMY      COLONOSCOPY      HYSTERECTOMY (CERVIX STATUS UNKNOWN)      SHOULDER SURGERY      2 on right 1 on left    SIGMOIDOSCOPY N/A 5/8/2019    SIGMOIDOSCOPY POLYP SNARE performed by Abelardo Arrington.,  at [address] [place] [address] ENDOSCOPY N/A 5/8/2019    EGD BIOPSY performed by Abelardo Arrington., DO at [facility] ENDOSCOPY     Family History   Problem Relation Age of Onset    Cancer Mother         brain    Cancer Father         lung     Social History     Socioeconomic History    Marital status:      Spouse name: Not on file    Number of children: Not on file  Years of education: Not on file    Highest education level: Not on file   Occupational History    Not on file   Tobacco Use    Smoking status: Former Smoker     Packs/day: 0.10     Years: 30.00     Pack years: 3.00     Types: Cigarettes     Quit date: 3/12/2013     Years since quittin.3    Smokeless tobacco: Never Used    Tobacco comment: would like to try chantix   Vaping Use    Vaping Use: Never used   Substance and Sexual Activity    Alcohol use: Yes     Comment: less than 1/week- Absolute    Drug use: Yes     Types: Other-see comments     Comment: THC gummies    Sexual activity: Yes     Partners: Male   Other Topics Concern    Not on file   Social History Narrative    Not on file     Social Determinants of Health     Financial Resource Strain:     Difficulty of Paying Living Expenses: Not on file   Food Insecurity:     Worried About Running Out of Food in the Last Year: Not on file    Petra of Food in the Last Year: Not on file   Transportation Needs:     Lack of Transportation (Medical): Not on file    Lack of Transportation (Non-Medical):  Not on file   Physical Activity:     Days of Exercise per Week: Not on file    Minutes of Exercise per Session: Not on file   Stress:     Feeling of Stress : Not on file   Social Connections:     Frequency of Communication with Friends and Family: Not on file    Frequency of Social Gatherings with Friends and Family: Not on file    Attends Temple Services: Not on file    Active Member of Clubs or Organizations: Not on file    Attends Club or Organization Meetings: Not on file    Marital Status: Not on file   Intimate Partner Violence:     Fear of Current or Ex-Partner: Not on file    Emotionally Abused: Not on file    Physically Abused: Not on file    Sexually Abused: Not on file   Housing Stability:     Unable to Pay for Housing in the Last Year: Not on file    Number of Places Lived in the Last Year: Not on file    Unstable Housing in the Last Year: Not on file     Current Facility-Administered Medications   Medication Dose Route Frequency Provider Last Rate Last Admin    ipratropium-albuterol (DUONEB) nebulizer solution 3 ampule  3 ampule Inhalation Once Carolyn Mcgovern MD        methylPREDNISolone sodium (SOLU-MEDROL) injection 125 mg  125 mg IntraVENous Once Carolyn Mcgovern MD         Current Outpatient Medications   Medication Sig Dispense Refill    doxycycline monohydrate (ADOXA) 100 MG tablet Take 1 tablet by mouth 2 times daily for 10 days 20 tablet 0    albuterol (PROVENTIL) (2.5 MG/3ML) 0.083% nebulizer solution Take 3 mLs by nebulization every 6 hours as needed for Wheezing (Patient not taking: Reported on 7/8/2022) 120 each 1    benzonatate (TESSALON PERLES) 100 MG capsule Take 1 capsule by mouth 3 times daily as needed for Cough 30 capsule 0    Magic Mouthwash (MIRACLE MOUTHWASH) Swish and spit 5 mLs 4 times daily as needed for Irritation (Patient not taking: Reported on 7/8/2022) 240 mL 0    ferrous sulfate 325 (65 Fe) MG tablet Take 1 tablet by mouth 2 times daily 60 tablet 5    Probiotic Product (ALIGN PO) Take by mouth (Patient not taking: Reported on 7/8/2022)      budesonide-formoterol (SYMBICORT) 160-4.5 MCG/ACT AERO Inhale 2 puffs into the lungs 2 times daily. 1 Inhaler 3     Allergies   Allergen Reactions    Aspirin Itching       REVIEW OF SYSTEMS  10 systems reviewed, pertinent positives per HPI otherwise noted to be negative. PHYSICAL EXAM  BP (!) 194/107   Pulse 85   Temp 98.2 °F (36.8 °C) (Oral)   Resp 17   Ht 5' 4\" (1.626 m)   Wt 252 lb 3.2 oz (114.4 kg)   SpO2 96%   BMI 43.29 kg/m²    GENERAL APPEARANCE: Awake and alert. Cooperative. No distress. HENT: Normocephalic. Atraumatic. Mucous membranes are dry. NECK: Supple. EYES: PERRL. EOM's grossly intact. HEART/CHEST: RRR. No murmurs. No chest wall tenderness.   LUNGS: Respirations mildly labored with bronchospastic coughing and moderate wheezing throughout with scattered rhonchi. No rales focally. ABDOMEN: No tenderness. Soft. Non-distended. No masses. No organomegaly. No guarding or rebound. Normal bowel sounds throughout. MUSCULOSKELETAL: No extremity edema. Compartments soft. No deformity. No tenderness in the extremities. All extremities neurovascularly intact. SKIN: Warm and dry. No acute rashes. NEUROLOGICAL: Alert and oriented. CN's 2-12 intact. No gross facial drooping. Strength 5/5, sensation intact. 2 plus DTR's in knees bilaterally. Gait normal.  PSYCHIATRIC: Normal mood and affect. LABS  I have reviewed all labs for this visit.    Results for orders placed or performed during the hospital encounter of 07/08/22   COVID-19 & Influenza Combo    Specimen: Nasopharyngeal Swab   Result Value Ref Range    SARS-CoV-2 RNA, RT PCR DETECTED (A) NOT DETECTED    INFLUENZA A NOT DETECTED NOT DETECTED    INFLUENZA B NOT DETECTED NOT DETECTED   CBC with Auto Differential   Result Value Ref Range    WBC 8.2 4.0 - 11.0 K/uL    RBC 4.32 4.00 - 5.20 M/uL    Hemoglobin 11.3 (L) 12.0 - 16.0 g/dL    Hematocrit 33.9 (L) 36.0 - 48.0 %    MCV 78.5 (L) 80.0 - 100.0 fL    MCH 26.1 26.0 - 34.0 pg    MCHC 33.3 31.0 - 36.0 g/dL    RDW 15.3 12.4 - 15.4 %    Platelets 858 386 - 095 K/uL    MPV 8.9 5.0 - 10.5 fL    Neutrophils % 65.9 %    Lymphocytes % 25.0 %    Monocytes % 6.5 %    Eosinophils % 1.6 %    Basophils % 1.0 %    Neutrophils Absolute 5.4 1.7 - 7.7 K/uL    Lymphocytes Absolute 2.1 1.0 - 5.1 K/uL    Monocytes Absolute 0.5 0.0 - 1.3 K/uL    Eosinophils Absolute 0.1 0.0 - 0.6 K/uL    Basophils Absolute 0.1 0.0 - 0.2 K/uL   Comprehensive Metabolic Panel w/ Reflex to MG   Result Value Ref Range    Sodium 147 (H) 136 - 145 mmol/L    Potassium reflex Magnesium 3.5 3.5 - 5.1 mmol/L    Chloride 108 99 - 110 mmol/L    CO2 30 21 - 32 mmol/L    Anion Gap 9 3 - 16    Glucose 120 (H) 70 - 99 mg/dL    BUN 15 7 - 20 mg/dL    CREATININE 0.8 0.6 - 1.2 mg/dL    GFR Non-African American >60 >60    GFR African American >60 >60    Calcium 9.9 8.3 - 10.6 mg/dL    Total Protein 8.7 (H) 6.4 - 8.2 g/dL    Albumin 4.7 3.4 - 5.0 g/dL    Albumin/Globulin Ratio 1.2 1.1 - 2.2    Total Bilirubin 0.6 0.0 - 1.0 mg/dL    Alkaline Phosphatase 109 40 - 129 U/L    ALT 24 10 - 40 U/L    AST 21 15 - 37 U/L   Troponin   Result Value Ref Range    Troponin <0.01 <0.01 ng/mL   Brain Natriuretic Peptide   Result Value Ref Range    Pro-BNP 23 0 - 124 pg/mL   Magnesium   Result Value Ref Range    Magnesium 2.60 (H) 1.80 - 2.40 mg/dL     The 12 lead EKG was interpreted by me as follows:  Rate: normal with a rate of 86  Rhythm: sinus  Axis: normal  Intervals: normal CA, narrow QRS, normal QTc   LVH changes  ST segments: no ST elevations or depressions  T waves: no abnormal inversions  Non-specific T wave changes: present  Prior EKG comparison: EKG dated 7/4/22 is not significantly different    RADIOLOGY    XR CHEST (2 VW)    Result Date: 7/4/2022  EXAMINATION: TWO XRAY VIEWS OF THE CHEST 7/4/2022 11:24 am COMPARISON: 11/06/2013. HISTORY: ORDERING SYSTEM PROVIDED HISTORY: cough TECHNOLOGIST PROVIDED HISTORY: Reason for exam:->cough Reason for Exam: Cough (mucous with blood since thursday. feels achy  had fever) FINDINGS: The cardiomediastinal silhouette is unremarkable. The lungs are clear. No infiltrate, pleural fluid or evidence of overt failure. Metallic pack projects over the left chest.  Post cholecystectomy clips. Osseous structures are unremarkable. No acute cardiopulmonary disease. XR CHEST PORTABLE    Result Date: 7/8/2022  EXAMINATION: ONE XRAY VIEW OF THE CHEST 7/8/2022 8:45 pm COMPARISON: 07/04/2022 HISTORY: ORDERING SYSTEM PROVIDED HISTORY: persistent worsening cough TECHNOLOGIST PROVIDED HISTORY: Reason for exam:->persistent worsening cough Reason for Exam: Shortness of Breath (Patient states she has been sob since the 4th of July.  Patient states she was dx with pneumonia but is not improving. ) FINDINGS: The heart is mildly enlarged but unchanged. The pulmonary vessels are engorged centrally more prominent. There are some hazy subsegmental bibasilar opacities which is more apparent. No effusion is seen. Stable borderline cardiomegaly with mild central pulmonary congestion or pulmonary artery hypertension which is more apparent. Mild bibasilar atelectasis or early infiltrates which is more prominent. CT CHEST PULMONARY EMBOLISM W CONTRAST    Result Date: 7/4/2022  EXAMINATION: CTA OF THE CHEST 7/4/2022 12:28 pm TECHNIQUE: CTA of the chest was performed after the administration of intravenous contrast.  Multiplanar reformatted images are provided for review. MIP images are provided for review. Automated exposure control, iterative reconstruction, and/or weight based adjustment of the mA/kV was utilized to reduce the radiation dose to as low as reasonably achievable. COMPARISON: CT chest dated 07/11/2014 HISTORY: ORDERING SYSTEM PROVIDED HISTORY: sob/elevated d dimer TECHNOLOGIST PROVIDED HISTORY: Reason for exam:->sob/elevated d dimer Decision Support Exception - unselect if not a suspected or confirmed emergency medical condition->Emergency Medical Condition (MA) Reason for Exam: sob/elevated d dimer FINDINGS: Pulmonary Arteries: Within the main, central most right, central most left pulmonary arteries, no evidence of filling defect to suggest large central pulmonary embolism. Evaluation of several smaller peripheral branches is limited by artifact. Mediastinum: Within the thoracic aorta, no gross intraluminal flap. Probable residual thymic tissue within the anterior mediastinum, appearing more fatty as compared to the prior. Visualized portion of thyroid is symmetric. No axillary adenopathy. Lungs/pleura: 5 mm noncalcified right upper lobe nodule has developed on series 2, image 64.   Additional bilateral pulmonary nodules are not substantially changed as compared to prior measuring up to approximately 7 mm. Patchy ground-glass and linear opacity is demonstrated within the lower lobes. No pneumothorax or pleural effusion. Upper Abdomen: Status post cholecystectomy. 1.2 cm right adrenal nodule, not markedly changed. Nodular thickening of the left adrenal gland appears grossly similar. Soft Tissues/Bones: Degenerative change throughout the spine. No findings to suggest large central pulmonary embolism. New 5 mm indeterminate noncalcified right upper lobe pulmonary nodule. Additional pulmonary nodules are not markedly changed. Follow-up recommendations are as below. Patchy linear and ground-glass bilateral lower lobe opacity, favoring atelectasis in the absence of signs or symptoms of pneumonitis. RECOMMENDATIONS: Fleischner Society guidelines for follow-up and management of incidentally detected pulmonary nodules: Single Solid Nodule: Nodule size less than 6 mm In a low-risk patient, no routine follow-up. In a high-risk patient, optional CT at 12 months. Nodule size equals 6-8 mm In a low-risk patient, CT at 6-12 months, then consider CT at 18-24 months. In a high-risk patient, CT at 6-12 months, then CT at 18-24 months. Nodule size greater than 8 mm In a low-risk patient, consider CT at 3 months, PET/CT, or tissue sampling. In a high-risk patient, consider CT at 3 months, PET/CT, or tissue sampling. Multiple Solid Nodules: Nodule size less than 6 mm In a low-risk patient, no routine follow-up. In a high-risk patient, optional CT at 12 months. Nodule size equals 6-8 mm In a low-risk patient, CT at 3-6 months, then consider CT at 18-24 months. In a high-risk patient, CT at 3-6 months, then CT at 18-24 months. Nodule size greater than 8 mm In a low-risk patient, CT at 3-6 months, then consider CT at 18-24 months. In a high-risk patient, CT at 3-6 months, then CT at 18-24 months.  - Low risk patients include individuals with minimal or absent history of smoking and other known risk factors. - High risk patients include individuals with a history or smoking or known risk factors. Radiology 2017 http://pubs. rsna.org/doi/full/10.1148/radiol. 0540544536       ED COURSE/MDM  Patient seen and evaluated. Old records reviewed. Labs and imaging reviewed and results discussed with patient. After initial evaluation, differential diagnostic considerations included: acute coronary syndrome, pulmonary embolism, COPD/asthma, pneumonia, sepsis, pericardial tamponade, pneumothorax, CHF, thoracic aortic dissection, anxiety    The patient's ED workup was notable for COVID-19 test today that was positive, consistent with her recent exposure despite her negative test a few days ago. Her chest x-ray appears overall stable but she has a worsening bronchospastic cough. She is 58years old with comorbid hypertension and asthma she will be admitted for nebulizers, steroids and as needed oxygen. Patient has no indication for antibiotics, given no leukocytosis and diagnosis of viral etiology pneumonia    Is this patient to be included in the SEP-1 Core Measure? No   Exclusion criteria - the patient is NOT to be included for SEP-1 Core Measure due to:  Viral etiology found or highly suspected (including COVID-19) without concomitant bacterial infection      During the patient's ED course, the patient was given:  Medications   ipratropium-albuterol (DUONEB) nebulizer solution 3 ampule (has no administration in time range)   methylPREDNISolone sodium (SOLU-MEDROL) injection 125 mg (has no administration in time range)        CLINICAL IMPRESSION  1. COVID    2. Pneumonia due to infectious organism, unspecified laterality, unspecified part of lung    3. Wheezing    4. Essential hypertension        Blood pressure (!) 194/107, pulse 85, temperature 98.2 °F (36.8 °C), temperature source Oral, resp. rate 17, height 5' 4\" (1.626 m), weight 252 lb 3.2 oz (114.4 kg), SpO2 96 %.     [facility] Jean Pierre Toscano was admitted in fair condition. The plan is to admit to the hospital at this time under the hospitalist service. Hospitalist accepted the patient and will take over the patient's care. DISCLAIMER: This chart was created using Dragon dictation software. Efforts were made by me to ensure accuracy, however some errors may be present due to limitations of this technology and occasionally words are not transcribed correctly.         Izzy See MD  07/08/22 0143

## 2022-07-09 NOTE — PROGRESS NOTES
Assessment completed, see doc flowsheets. Pt is A&Ox4. Lung sounds are diminished. VSS. Tele on. Medication given per [facility]. Patient has no needs at this time. Call light within in reach, will continue to monitor.

## 2022-07-09 NOTE — PROGRESS NOTES
Hospitalist Progress Note      PCP: Mickey Casas MD    Date of Admission: 7/8/2022    Chief Complaint: Dyspnea    Hospital Course:   Patient reports improvement in breathing  Saturating 94 on room air  Lactic acid 3.9  Just has some cough and feels a little weak  No fevers  Procalcitonin is only 0.07      Medications:  Reviewed      Exam:    /77   Pulse 95   Temp 96.8 °F (36 °C) (Axillary)   Resp 20   Ht 5' 4\" (1.626 m)   Wt 253 lb (114.8 kg)   SpO2 92%   BMI 43.43 kg/m²     General appearance: No apparent distress, appears stated age and cooperative. HEENT: Pupils equal, round, and reactive to light. Conjunctivae/corneas clear. Neck: Supple, with full range of motion. No jugular venous distention. Trachea midline. Respiratory:  Normal respiratory effort. Clear to auscultation, bilaterally without RALES/WHEEZES/Rhonchi. Cardiovascular: Regular rate and rhythm with normal S1/S2 without MURMURS, rubs or gallops. Abdomen: Soft, non-tender, non-distended with normal bowel sounds. Musculoskeletal: No clubbing, cyanosis or EDEMA bilaterally. Full range of motion without deformity. Skin: Skin color, texture, turgor normal.  No rashes or lesions. Neurologic:  Neurovascularly intact without any focal sensory/motor deficits.  Cranial nerves: II-XII intact, grossly non-focal.        Labs:   Recent Labs     07/08/22 2049 07/09/22  0834   WBC 8.2 10.7   HGB 11.3* 11.7*   HCT 33.9* 36.5    305     Recent Labs     07/08/22 2049 07/09/22  0834   * 133*   K 3.5 4.4    100   CO2 30 23   BUN 15 14   CREATININE 0.8 0.7   CALCIUM 9.9 10.1     Recent Labs     07/08/22 2049 07/09/22  0834   AST 21 24   ALT 24 25   BILITOT 0.6 0.7   ALKPHOS 109 112     Recent Labs     07/09/22  0834   INR 1.02     Recent Labs     07/08/22 2049   TROPONINI <0.01       Urinalysis:      Lab Results   Component Value Date/Time    NITRU Negative 01/06/2017 10:08 PM    WBCUA 2 01/06/2017 10:08 PM    RBCUA 4 01/06/2017 10:08 PM    BLOODU TRACE 01/06/2017 10:08 PM    SPECGRAV >1.030 01/06/2017 10:08 PM    GLUCOSEU Negative 01/06/2017 10:08 PM       Radiology:  XR CHEST PORTABLE   Final Result   Stable borderline cardiomegaly with mild central pulmonary congestion or   pulmonary artery hypertension which is more apparent. Mild bibasilar atelectasis or early infiltrates which is more prominent. Assessment/Plan:    Active Hospital Problems    Diagnosis Date Noted    COVID-19 [U07.1] 07/09/2022     Priority: Medium    Pneumonia due to COVID-19 virus [U07.1, J12.82] 07/08/2022     Priority: Medium       Acute Medical Issues Being Addressed:    57-year-old admitted to the hospital complaining of some dyspnea    Dyspnea secondary to COVID-19 with possible mild exacerbation of asthma  No wheezing on exam  COVID-19 is positive  Change over to IV dexamethasone  CRP is only 18  D-dimer at 1.56  On Lovenox twice daily  No indication for any further treatments at this point  Her breathing is improved  At this point the plan is to monitor her over the next 24 hours if she remains oxygen free then she can be discharged tomorrow on dexamethasone  I do not think she has a bacterial pneumonia given her procalcitonin no need for antibiotic    Lactic acidosis suspect secondary to dehydration  Will give IV fluid bolus started on IV fluids and monitor lactic acid    Obesity    If doing better she can be discharged tomorrow next  DVT Prophylaxis: SC LOVENOX BID   Diet: ADULT DIET;  Regular  Code Status: Full Code      Dispo - once acute medical processes have resolved    Michael Palacios MD

## 2022-07-09 NOTE — ED NOTES
Pt's visitor was verbally aggressive and rude getting in staff's face. Staff asked him to stop he continued. Gave him a warning that he would be removed.      Ry Mackey RN  07/08/22 [id number]

## 2022-07-10 LAB
C-REACTIVE PROTEIN: 6.7 MG/L (ref 0–5.1)
D DIMER: 1.13 UG/ML FEU (ref 0–0.6)
LACTIC ACID: 1.7 MMOL/L (ref 0.4–2)

## 2022-07-10 PROCEDURE — 85379 FIBRIN DEGRADATION QUANT: CPT

## 2022-07-10 PROCEDURE — 87070 CULTURE OTHR SPECIMN AEROBIC: CPT

## 2022-07-10 PROCEDURE — 87449 NOS EACH ORGANISM AG IA: CPT

## 2022-07-10 PROCEDURE — 96376 TX/PRO/DX INJ SAME DRUG ADON: CPT

## 2022-07-10 PROCEDURE — 36415 COLL VENOUS BLD VENIPUNCTURE: CPT

## 2022-07-10 PROCEDURE — 94640 AIRWAY INHALATION TREATMENT: CPT

## 2022-07-10 PROCEDURE — 86140 C-REACTIVE PROTEIN: CPT

## 2022-07-10 PROCEDURE — G0378 HOSPITAL OBSERVATION PER HR: HCPCS

## 2022-07-10 PROCEDURE — 6360000002 HC RX W HCPCS: Performed by: INTERNAL MEDICINE

## 2022-07-10 PROCEDURE — 6370000000 HC RX 637 (ALT 250 FOR IP): Performed by: NURSE PRACTITIONER

## 2022-07-10 PROCEDURE — 6370000000 HC RX 637 (ALT 250 FOR IP): Performed by: INTERNAL MEDICINE

## 2022-07-10 PROCEDURE — 83605 ASSAY OF LACTIC ACID: CPT

## 2022-07-10 PROCEDURE — 94761 N-INVAS EAR/PLS OXIMETRY MLT: CPT

## 2022-07-10 PROCEDURE — 94680 O2 UPTK RST&XERS DIR SIMPLE: CPT

## 2022-07-10 PROCEDURE — 2580000003 HC RX 258: Performed by: INTERNAL MEDICINE

## 2022-07-10 PROCEDURE — 87205 SMEAR GRAM STAIN: CPT

## 2022-07-10 RX ORDER — BUTALBITAL, ACETAMINOPHEN AND CAFFEINE 50; 325; 40 MG/1; MG/1; MG/1
1 TABLET ORAL EVERY 8 HOURS PRN
Status: DISPENSED | OUTPATIENT
Start: 2022-07-10 | End: 2022-07-11

## 2022-07-10 RX ADMIN — Medication 2 PUFF: at 10:11

## 2022-07-10 RX ADMIN — SODIUM CHLORIDE, POTASSIUM CHLORIDE, SODIUM LACTATE AND CALCIUM CHLORIDE: 600; 310; 30; 20 INJECTION, SOLUTION INTRAVENOUS at 00:06

## 2022-07-10 RX ADMIN — BUTALBITAL, ACETAMINOPHEN, AND CAFFEINE 1 TABLET: 50; 325; 40 TABLET ORAL at 16:37

## 2022-07-10 RX ADMIN — DEXAMETHASONE SODIUM PHOSPHATE 6 MG: 10 INJECTION, SOLUTION INTRAMUSCULAR; INTRAVENOUS at 08:40

## 2022-07-10 RX ADMIN — SODIUM CHLORIDE, POTASSIUM CHLORIDE, SODIUM LACTATE AND CALCIUM CHLORIDE: 600; 310; 30; 20 INJECTION, SOLUTION INTRAVENOUS at 18:56

## 2022-07-10 RX ADMIN — SODIUM CHLORIDE, POTASSIUM CHLORIDE, SODIUM LACTATE AND CALCIUM CHLORIDE: 600; 310; 30; 20 INJECTION, SOLUTION INTRAVENOUS at 08:45

## 2022-07-10 RX ADMIN — Medication 2 PUFF: at 21:24

## 2022-07-10 RX ADMIN — ACETAMINOPHEN 650 MG: 325 TABLET ORAL at 12:41

## 2022-07-10 ASSESSMENT — PAIN SCALES - GENERAL
PAINLEVEL_OUTOF10: 4
PAINLEVEL_OUTOF10: 4
PAINLEVEL_OUTOF10: 10

## 2022-07-10 NOTE — PLAN OF CARE
Problem: Safety - Adult  Goal: Free from fall injury  7/9/2022 2201 by Thiago Perez RN  Outcome: Progressing

## 2022-07-10 NOTE — PROGRESS NOTES
Hospitalist Progress Note      PCP: Carol Gonsalez MD    Date of Admission: 7/8/2022    Chief Complaint: Dyspnea    Interval history:  Pt seen and examined today. Overnight events noted, interval ancillary notes and labs reviewed. Patient reported intractable headache, cough, shortness of breath on exertion extreme weakness and lethargy but denied any denied fevers, chills, chest pain, nausea, vomiting or abdominal pain  Requiring oxygen on exertion. Assessment/Plan:    Active Hospital Problems    Diagnosis Date Noted    COVID-19 [U07.1] 07/09/2022     Priority: Medium    Pneumonia due to COVID-19 virus [U07.1, J12.82] 07/08/2022     Priority: Medium         This 58year-old admitted to the hospital complaining of some dyspnea    Dyspnea secondary to COVID-19 infection  No wheezing on exam. COVID-19 is positive  Change over to IV dexamethasone. CRP is only 18. D-dimer at 1.56  On Lovenox twice daily. Continue droplet plus precautions  No indication for any further treatments at this point    Lactic acidosis suspect secondary to dehydration: Resolved with IV fluids      Obesity: Counseled on weight loss and healthy lifestyle modifications    DVT Prophylaxis: SC LOVENOX BID   Diet: ADULT DIET; Regular  Code Status: Full Code      Dispo -plan for the patient to discharge home tomorrow if medically stable          Medications:  Reviewed      Exam:    BP (!) 181/102 Comment: moving in bed  Pulse 83   Temp (!) 96.3 °F (35.7 °C) (Oral)   Resp 16   Ht 5' 4\" (1.626 m)   Wt 253 lb (114.8 kg)   SpO2 93%   BMI 43.43 kg/m²     General appearance: No apparent distress, appears stated age and cooperative. HEENT: Pupils equal, round, and reactive to light. Conjunctivae/corneas clear. Neck: Supple, with full range of motion. No jugular venous distention. Trachea midline. Respiratory:  Normal respiratory effort. Clear to auscultation, bilaterally without RALES/WHEEZES/Rhonchi.   Cardiovascular: Regular rate and rhythm with normal S1/S2 without MURMURS, rubs or gallops. Abdomen: Soft, non-tender, non-distended with normal bowel sounds. Musculoskeletal: No clubbing, cyanosis or EDEMA bilaterally. Full range of motion without deformity. Skin: Skin color, texture, turgor normal.  No rashes or lesions. Neurologic:  Neurovascularly intact without any focal sensory/motor deficits. Cranial nerves: II-XII intact, grossly non-focal.        Labs:   Recent Labs     07/08/22 2049 07/09/22  0834   WBC 8.2 10.7   HGB 11.3* 11.7*   HCT 33.9* 36.5    305     Recent Labs     07/08/22 2049 07/09/22  0834   * 133*   K 3.5 4.4    100   CO2 30 23   BUN 15 14   CREATININE 0.8 0.7   CALCIUM 9.9 10.1     Recent Labs     07/08/22 2049 07/09/22  0834   AST 21 24   ALT 24 25   BILITOT 0.6 0.7   ALKPHOS 109 112     Recent Labs     07/09/22  0834   INR 1.02     Recent Labs     07/08/22 2049   Deloria Chess <0.01       Urinalysis:      Lab Results   Component Value Date/Time    NITRU Negative 01/06/2017 10:08 PM    WBCUA 2 01/06/2017 10:08 PM    RBCUA 4 01/06/2017 10:08 PM    BLOODU TRACE 01/06/2017 10:08 PM    SPECGRAV >1.030 01/06/2017 10:08 PM    GLUCOSEU Negative 01/06/2017 10:08 PM       Radiology:  XR CHEST PORTABLE   Final Result   Stable borderline cardiomegaly with mild central pulmonary congestion or   pulmonary artery hypertension which is more apparent. Mild bibasilar atelectasis or early infiltrates which is more prominent.              Kar Gaines MD

## 2022-07-10 NOTE — PLAN OF CARE
Problem: Safety - Adult  Goal: Free from fall injury  7/10/2022 0959 by Aron Cui RN  Outcome: Progressing  7/9/2022 2201 by Lyla Rose RN  Outcome: Progressing     Problem: ABCDS Injury Assessment  Goal: Absence of physical injury  Outcome: Progressing

## 2022-07-10 NOTE — PROGRESS NOTES
Shift assessment completed. Vitals obtained, BP elevated, pt currently repositioning and sitting on side of bed. No pain or distress observed. Pt ambulated to bathroom independently, tolerated fairly well, dyspneic on exertion. Sputum cx collected and sent to lab. Pt expresses no current needs at this time. Call light in reach.

## 2022-07-10 NOTE — PROGRESS NOTES
07/10/22 1013   Resting (Room Air)   SpO2 92   HR 87   During Walk (Room Air)   SpO2 87   Walk/Assistance Device Ambulation   Rate of Dyspnea 2   Symptoms Dizziness; Fatigue   During Walk (On O2)   SpO2 2   HR 97   O2 Device Nasal cannula   O2 Flow Rate (l/min) 2 l/min   Walk/Assistance Device Ambulation   Rate of Dyspnea 2   Symptoms Dizziness; Fatigue   After Walk   SpO2 90   HR 74   Rate of Dyspnea 1   Does the Patient Qualify for Home O2 Yes   Liter Flow at Rest 0   Liter Flow on Exertion 2   Does the Patient Need Portable Oxygen Tanks Yes     Electronically signed by Grecia Parker RCP on 7/10/2022 at 10:14 AM

## 2022-07-10 NOTE — FLOWSHEET NOTE
07/09/22 2157   Assessment   Charting Type Shift assessment   Psychosocial   Psychosocial (WDL) WDL   Neurological   Neuro (WDL) WDL   Level of Consciousness Alert (0)   [place] Coma Scale   Eye Opening 4   Best Verbal Response 5   Best Motor Response 6   [place] Coma Scale Score 15   HEENT (Head, Ears, Eyes, Nose, & Throat)   HEENT (WDL) X   Right Eye Glasses   Left Eye Glasses   Respiratory   Respiratory (WDL) X   Respiratory Pattern Regular   Respiratory Depth Normal   Respiratory Quality/Effort Unlabored   Chest Assessment Chest expansion symmetrical   L Breath Sounds Diminished   R Breath Sounds Diminished   Cardiac   Cardiac (WDL) WDL   Gastrointestinal   Abdominal (WDL) WDL   Genitourinary   Genitourinary (WDL) WDL   Peripheral Vascular   Peripheral Vascular (WDL) X   Edema Right lower extremity; Left lower extremity   RLE Edema Trace   LLE Edema Trace   Skin Integumentary    Skin Integumentary (WDL) WDL   Musculoskeletal   Musculoskeletal (WDL) WDL

## 2022-07-11 VITALS
TEMPERATURE: 96.8 F | HEART RATE: 79 BPM | WEIGHT: 253 LBS | OXYGEN SATURATION: 97 % | BODY MASS INDEX: 43.19 KG/M2 | RESPIRATION RATE: 18 BRPM | HEIGHT: 64 IN | SYSTOLIC BLOOD PRESSURE: 113 MMHG | DIASTOLIC BLOOD PRESSURE: 66 MMHG

## 2022-07-11 LAB
PROCALCITONIN: 0.06 NG/ML (ref 0–0.15)
STREP PNEUMONIAE ANTIGEN, URINE: NORMAL

## 2022-07-11 PROCEDURE — 84145 PROCALCITONIN (PCT): CPT

## 2022-07-11 PROCEDURE — 94680 O2 UPTK RST&XERS DIR SIMPLE: CPT

## 2022-07-11 PROCEDURE — 2580000003 HC RX 258: Performed by: NURSE PRACTITIONER

## 2022-07-11 PROCEDURE — 6370000000 HC RX 637 (ALT 250 FOR IP): Performed by: INTERNAL MEDICINE

## 2022-07-11 PROCEDURE — 94761 N-INVAS EAR/PLS OXIMETRY MLT: CPT

## 2022-07-11 PROCEDURE — 36415 COLL VENOUS BLD VENIPUNCTURE: CPT

## 2022-07-11 PROCEDURE — 97166 OT EVAL MOD COMPLEX 45 MIN: CPT

## 2022-07-11 PROCEDURE — 2580000003 HC RX 258: Performed by: INTERNAL MEDICINE

## 2022-07-11 PROCEDURE — 1200000000 HC SEMI PRIVATE

## 2022-07-11 PROCEDURE — G0378 HOSPITAL OBSERVATION PER HR: HCPCS

## 2022-07-11 PROCEDURE — 6360000002 HC RX W HCPCS: Performed by: INTERNAL MEDICINE

## 2022-07-11 PROCEDURE — 97116 GAIT TRAINING THERAPY: CPT

## 2022-07-11 PROCEDURE — 97161 PT EVAL LOW COMPLEX 20 MIN: CPT

## 2022-07-11 RX ORDER — GUAIFENESIN/DEXTROMETHORPHAN 100-10MG/5
5 SYRUP ORAL EVERY 8 HOURS PRN
Qty: 120 ML | Refills: 0 | Status: SHIPPED | OUTPATIENT
Start: 2022-07-11 | End: 2022-07-16

## 2022-07-11 RX ORDER — PREDNISONE 1 MG/1
5 TABLET ORAL DAILY
Status: DISCONTINUED | OUTPATIENT
Start: 2022-07-11 | End: 2022-07-11

## 2022-07-11 RX ORDER — GUAIFENESIN/DEXTROMETHORPHAN 100-10MG/5
5 SYRUP ORAL EVERY 8 HOURS PRN
Status: DISCONTINUED | OUTPATIENT
Start: 2022-07-11 | End: 2022-07-11 | Stop reason: HOSPADM

## 2022-07-11 RX ORDER — DEXAMETHASONE SODIUM PHOSPHATE 10 MG/ML
6 INJECTION, SOLUTION INTRAMUSCULAR; INTRAVENOUS DAILY
Status: COMPLETED | OUTPATIENT
Start: 2022-07-11 | End: 2022-07-11

## 2022-07-11 RX ORDER — PREDNISONE 20 MG/1
40 TABLET ORAL DAILY
Status: DISCONTINUED | OUTPATIENT
Start: 2022-07-12 | End: 2022-07-11

## 2022-07-11 RX ORDER — DEXAMETHASONE 6 MG/1
6 TABLET ORAL
Qty: 6 TABLET | Refills: 0 | Status: SHIPPED | OUTPATIENT
Start: 2022-07-12 | End: 2022-07-18

## 2022-07-11 RX ORDER — DEXAMETHASONE SODIUM PHOSPHATE 10 MG/ML
6 INJECTION, SOLUTION INTRAMUSCULAR; INTRAVENOUS EVERY 6 HOURS
Status: DISCONTINUED | OUTPATIENT
Start: 2022-07-11 | End: 2022-07-11

## 2022-07-11 RX ORDER — DEXAMETHASONE 4 MG/1
6 TABLET ORAL DAILY
Status: DISCONTINUED | OUTPATIENT
Start: 2022-07-12 | End: 2022-07-11 | Stop reason: HOSPADM

## 2022-07-11 RX ADMIN — GUAIFENESIN AND DEXTROMETHORPHAN 5 ML: 100; 10 SYRUP ORAL at 09:27

## 2022-07-11 RX ADMIN — Medication 2 PUFF: at 09:36

## 2022-07-11 RX ADMIN — SODIUM CHLORIDE, POTASSIUM CHLORIDE, SODIUM LACTATE AND CALCIUM CHLORIDE: 600; 310; 30; 20 INJECTION, SOLUTION INTRAVENOUS at 06:12

## 2022-07-11 RX ADMIN — DEXAMETHASONE SODIUM PHOSPHATE 6 MG: 10 INJECTION, SOLUTION INTRAMUSCULAR; INTRAVENOUS at 14:49

## 2022-07-11 RX ADMIN — SODIUM CHLORIDE, PRESERVATIVE FREE 10 ML: 5 INJECTION INTRAVENOUS at 14:52

## 2022-07-11 RX ADMIN — BUTALBITAL, ACETAMINOPHEN, AND CAFFEINE 1 TABLET: 50; 325; 40 TABLET ORAL at 02:50

## 2022-07-11 ASSESSMENT — PAIN DESCRIPTION - LOCATION: LOCATION: HEAD

## 2022-07-11 ASSESSMENT — PAIN SCALES - GENERAL: PAINLEVEL_OUTOF10: 4

## 2022-07-11 ASSESSMENT — PAIN DESCRIPTION - DESCRIPTORS: DESCRIPTORS: DULL;ACHING

## 2022-07-11 NOTE — DISCHARGE INSTRUCTIONS
Follow up with your PCP within 7-10 days of discharge. Quarantine yourself for total of 10 days since the start of symptoms   Follow up with pulmonology as instructed   Follow up with cardiology  as instructed   Take all your medications as prescribed.

## 2022-07-11 NOTE — CARE COORDINATION
[facility] received a referral to this patient for home 02 @ 2 lpm w exertion via nc. Pull behind portable concentrator has been delivered to the hospital floor for discharge. Thank you for the referral.  Electronically signed by Bridger Landa on 7/11/2022 at 1:16 PM  Cell ph# [phone]    NOTE: After 5:00 pm, Weekends, Holidays: Call [facility] at [phone] to coordinate delivery of home medical equipment.

## 2022-07-11 NOTE — CARE COORDINATION
The following home care agency will follow the patient:    [facility]  Phone: [phone]  Fax: [phone]    Home care orders faxed to agency

## 2022-07-11 NOTE — PROGRESS NOTES
[address] [facility],[address] Msb Department   Phone: [phone]    Occupational Therapy    [x] Initial Evaluation            [] Daily Treatment Note         [] Discharge Summary      Patient: Braeden Banuelos   : 1960   MRN: [id number]   Date of Service:  2022    Admitting Diagnosis:  Pneumonia due to COVID-19 virus  Current Admission Summary: Braeden Banuelos is a 58 y.o. female with hx HTN, asthma, GERD, and HLD. Patient presented to the emergency room for increasing shortness of breath. Patient recently returned from [facility] on vacation. She states on Thursday she started having a headache tired flulike symptoms they returned home on Friday from [facility] both her and her  were having similar symptoms. Over the weekend they were both very tired and slept she reports cough as well as sore throat and congestion. Both evaluated in the emergency room on . Difficulty obtaining a COVID test on her however her  did test positive. She was discharged home on doxycycline and albuterol nebs. She states she did not have machine to do the albuterol treatments. She was taking the doxycycline she is on day 4 of antibiotics. Denies any fevers but still has chills. She is on RA and in no distress. She did test positive today in ER for COVID  Past Medical History:  has a past medical history of Asthma, Blood transfusion reaction, GERD (gastroesophageal reflux disease), Headache(784.0), HTN (hypertension), benign, Hyperlipidemia, Major depression in remission ([address] and Rectal bleeding. Past Surgical History:  has a past surgical history that includes Cholecystectomy; Hysterectomy; Colonoscopy; shoulder surgery; sigmoidoscopy (N/A, 2019); and Upper gastrointestinal endoscopy (N/A, 2019). Discharge Recommendations: Braeden Banuelos scored a 20/24 on the AM-PAC ADL Inpatient form.  Current research shows that an AM-PAC score of 18 or greater is typically associated with a discharge to the patient's home setting. Based on the patient's AM-PAC score, and their current ADL deficits, it is recommended that the patient have 2-3 sessions per week of Occupational Therapy at d/c to increase the patient's independence. At this time, this patient demonstrates the endurance and safety to discharge home with home health care (home vs OP services) and a follow up treatment frequency of 2-3x/wk. Please see assessment section for further patient specific details. If patient discharges prior to next session this note will serve as a discharge summary. Please see below for the latest assessment towards goals. DME Required For Discharge: patient has all required DME for discharge    Precautions/Restrictions: high fall risk, Isolation precautions, . Comment: COVID  Weight Bearing Restrictions: no restrictions  [] Right Upper Extremity  [] Left Upper Extremity [] Right Lower Extremity  [] Left Lower Extremity     Required Braces/Orthotics: no braces required   [] Right  [] Left  Positional Restrictions:no positional restrictions      Pre-Admission Information     Lives With: spouse, family    Type of Home: house  Home Layout: two level, bedroom/bathroom upstairs, ~20 stairs to 2nd level with R/L HR  Home Access: ~1 step to enter without rails   Bathroom Layout: walk in shower  Bathroom Equipment: grab bars in shower, grab bars around toilet, shower chair, hand held shower head  Home Equipment: rollator - 4 wheeled walker  Transfer Assistance: modified independent with use of 4WW  Ambulation Assistance:modified independent with use of 4WW  ADL Assistance: independent with all ADL's  IADL Assistance: requires assistance with laundry,  does laundry  Active : [] yes  [x]no, hasn't been driving for past month  Current Employment: full time employment.   Occupation: government work  Hobbies: Porch sitting, cooking   Recent Falls: Patient reports 1 fall stepping up out of bed, beginning of June. Examination     Vision:   Vision Gross Assessment: Impaired and Vision Corrective Device: wears glasses at all times  Hearing:   [facility]  Perception:   WFL  Observation:   General Observation:  2L O2 upon arrival  Posture:   Good   Sensation:   reports numbness and tingling in (B) LE   Proprioception:    WFL  Tone:   Normotonic  Coordination Testing:   WFL    ROM:   (B) UE AROM WFL  Strength:   (B) UE strength grossly WFL    Decision Making: medium complexity  Clinical Presentation: evolving      Subjective    General: Patient seated in chair upon arrival with house keeping present, agreeable to therapy evaluation. Pain: 0/10  Pain Interventions: not applicable           Activities of Daily Living    Basic Activities of Daily Living  General Comments: Patient declined any ADLs stated she had just used restroom and would get changed when  arrived  Instrumental Activities of Daily Living  No IADL completed on this date. Functional Mobility    Bed Mobility  Bed mobility not completed on this date. Comments: Patient seated in chair upon arrival  Transfers  Sit to stand transfer:stand by assistance  Stand to sit transfer: stand by assistance  Comments: Patient with minimal verbal cueing needed for hand placement  Functional Mobility:  Standing Balance: stand by assistance. Standing Balance Comment: functional transfers, functional mobility   Functional Mobility: rolling walker. stand by assistance. Activity: in room   Comments: SPO2 95% following ambulation 92-93% and BP was 155/73 and HR was 85. Patient ambulated ~50ft in room. Patient with 1 standing rest break ~30 seconds. Patient with use of RW for support and good spatial awareness of O2 tubing. Patient completed 20 marches in place and with slight unsteadiness, SBA to correct.       Functional Outcomes  AM-PAC Inpatient Daily Activity Raw Score: 20      Cognition  WFL  Orientation:    alert and oriented x 4  Command Following:   [facility]     Education    Barriers To Learning: none  Patient Education: patient educated on goals, OT role and benefits, plan of care, energy conservation, disease specific education, transfer training, discharge recommendations  Learning Assessment:  patient verbalizes and demonstrates understanding    Assessment    Activity Tolerance: Patient tolerated treatment well, some increased SOB and fatigue following functional mobility. Impairments Requiring Therapeutic Intervention: decreased functional mobility, decreased safety awareness, decreased endurance, decreased balance  Prognosis: fair  Clinical Assessment: Patient presents with the above deficits impacting occupational performance and functioning below baseline, skilled OT services needed for facilitation of return to [facility]. Safety Interventions: patient left in chair, chair alarm in place, call light within reach, gait belt, patient at risk for falls and nurse notified       Plan    Frequency: 3-5 x/per week  Current Treatment Recommendations: balance training, functional mobility training, transfer training, endurance training, patient/caregiver education, ADL/self-care training, home management training, home exercise program and safety education    Goals    Patient Goals: Patient wants to get back home     Short Term Goals:  Time Frame: Discharge  Patient will complete lower body ADL at modified independent   Patient will complete toileting at modified independent   Patient will complete functional transfers at modified independent   Patient will complete functional mobility at modified independent   Patient to gather and transport IADL items at modified independent        Therapy Session Time     Individual Group Co-treatment   Time In 1239      Time Out 1309      Minutes 30           Timed Code Treatment Minutes:   15 minutes     Total Treatment Minutes:  30 minutes   Patient is in bservation status.      Electronically Signed By: Fredis Beasley, OTR/L [id number]

## 2022-07-11 NOTE — PROGRESS NOTES
Assessment complete and charted earlier in shift. Pt remains stable on RA. LCTA on assessment, IS provided with fair tolerance reaching 200. Pt c/o thick green sputum and frequent cough but states tessalon pearls are minimally helpful, will request expectorant from day team. Pt does continue to c/o HA- resolved with fioricet administration. Call light within reach, will continue to monitor.

## 2022-07-11 NOTE — PROGRESS NOTES
07/11/22 0948   Resting (Room Air)   SpO2 92   HR 91   Resting (On O2)   SpO2 93   HR 95   During Walk (Room Air)   SpO2 87   Walk/Assistance Device Walker   Rate of Dyspnea 3   During Walk (On O2)   SpO2 1   HR 98   O2 Device Nasal cannula   Walk/Assistance Device Walker   Rate of Dyspnea 3   After Walk   SpO2 91   HR 96   O2 Device Nasal cannula   O2 Flow Rate (l/min) 1 l/min   Rate of Dyspnea 2   Does the Patient Qualify for Home O2 Yes   Liter Flow at Rest 0   Liter Flow on Exertion 1   Does the Patient Need Portable Oxygen Tanks Yes

## 2022-07-11 NOTE — PROGRESS NOTES
[facility]   Phone: [phone]    Physical Therapy    [x] Initial Evaluation            [] Daily Treatment Note         [] Discharge Summary      Patient: Inna Garcia   : 1960   MRN: [id number]   Date of Service:  2022  Admitting Diagnosis: Pneumonia due to COVID-19 virus  Current Admission Summary: Inna Garcia is a 58 y.o. female  who presents to the ED complaining of cough and SOB. Seen here 22 for similar sx, CTPA then showed bilateral GGO but no PE, significant other had COVID but pt tested negative, and prescribed doxycycline, tessalon, and albuterol nebulizer liquid though pt states does not have a machine to use this. No fevers. Cough produces yellowish green dark sputum. Chest hurts mainly from coughing spells. H/o asthma. No belly pain vomiting or diarrhea. Past Medical History:  has a past medical history of Asthma, Blood transfusion reaction, GERD (gastroesophageal reflux disease), Headache(784.0), HTN (hypertension), benign, Hyperlipidemia, Major depression in remission ([facility] [address] and Rectal bleeding. Past Surgical History:  has a past surgical history that includes Cholecystectomy; Hysterectomy; Colonoscopy; shoulder surgery; sigmoidoscopy (N/A, 2019); and Upper gastrointestinal endoscopy (N/A, 2019). Discharge Recommendations: Inna Garcia scored a 19/24 on the AM-PAC short mobility form. Current research shows that an AM-PAC score of 18 or greater is typically associated with a discharge to the patient's home setting. Based on the patient's AM-PAC score and their current functional mobility deficits, it is recommended that the patient have 2-3 sessions per week of Physical Therapy at d/c to increase the patient's independence. At this time, this patient demonstrates the endurance and safety to discharge home with HHPT and a follow up treatment frequency of 2-3x/wk.   Please see assessment martinal. Pt on 2L O2 resting at Spo2 95%   Pain: 0/10  Pain Interventions: not applicable       Functional Mobility  Bed Mobility  Comments: did not assess, pt in recliner at beginning and EOS   Transfers  Sit to stand transfer: stand by assistance  Stand to sit transfer: stand by assistance  Comments: Transfers from recliner   Ambulation  Surface:level surface  Assistive Device: rolling walker  Other Appliance: supplemental O2  Assistance: stand by assistance  Distance: 60ft   Gait Mechanics: decreased may, decreased step length, decreased step height   Comments:  Pt ambulates 60ft in room with RW SBA before requesting seated rest break   Stair Mobility  Comments: Stairs not assessed this date due to COVID precautions however pt able to complete x20 marches standing at RW SBA   Wheelchair Mobility:  No w/c mobility completed on this date. Comments:  Balance  Static Sitting Balance: good(+): independent with high level dynamic balance in unsupported position  Dynamic Sitting Balance: good(+): independent with high level dynamic balance in unsupported position  Static Standing Balance: fair (-): maintains balance at SBA with use of UE support  Dynamic Standing Balance: fair (-): maintains balance at SBA  with use of UE support  Comments:    Other Therapeutic Interventions    Functional Outcomes  AM-PAC Inpatient Mobility Raw Score : 19              Cognition  WFL  Orientation:    alert and oriented x 4  Command Following:   [facility]    Education  Barriers To Learning: none  Patient Education: patient educated on goals, PT role and benefits, plan of care, general safety, discharge recommendations Pt educated to only perform stair negotiation with  present   Learning Assessment:  patient verbalizes and demonstrates understanding    Assessment  Activity Tolerance: Pt reports some lightheadedness during ambulation, BP post ambulation 155/73.  Pt on 2L O2 with Spo2 >92% throughout session   Impairments Requiring Therapeutic Intervention: decreased functional mobility, decreased strength, decreased endurance, decreased balance  Prognosis: good  Clinical Assessment: Pt presents to hospital with COVID 19. Pt is able to complete functional transfers SBA and ambulate 60ft with RW SBA with SPo2 >92% throughout session on 2L O2. Pt does have 20 steps to negotiate at home however pt educated to only perform while  is present. Pt would continue to benefit from skilled PT in order to improve endurance, strength, balance and safely return to PLOF.    Safety Interventions: patient left in chair, chair alarm in place, call light within reach, gait belt, patient at risk for falls and nurse notified    Plan  Frequency: 3-5 x/per week  Current Treatment Recommendations: strengthening, balance training, functional mobility training, transfer training, gait training, stair training and endurance training    Goals  Patient Goals: none stated    Short Term Goals:  Time Frame: upon discharge   Patient will complete bed mobility at Independent   Patient will complete transfers at Independent   Patient will ambulate 150 ft with use of LRAD at Independent  Patient will ascend/descend 20 stairs with (B) handrail at modified independent    Therapy Session Time      Individual Group Co-treatment   Time In     1239   Time Out     1309   Minutes     30     Timed Code Treatment Minutes:   15  Total Treatment Minutes:  30       Electronically Signed By: Husam Cartagena PT    Husam Cartagena PT

## 2022-07-11 NOTE — DISCHARGE INSTR - COC
Continuity of Care Form    Patient Name: Genie Estrada   :  1960  MRN:  [id number]    Admit date:  2022  Discharge date:  2022    Code Status Order: Full Code   Advance Directives:      Admitting Physician:  Aristeo Chatman MD  PCP: Yanique Carrasco MD    Discharging Nurse: [facility] Unit/Room#: 8DA-5842/2046-35  Discharging Unit Phone Number: [phone]    Emergency Contact:   Extended Emergency Contact Information  Primary Emergency Contact: Vivian Rudi  Address: [address] [place] [address], [address] [place] [address] Phone: [phone]  Mobile Phone: [phone]  Relation: Spouse  Secondary Emergency Contact: Eliezer Gallagher  [place] Phone: [phone]  Relation: Brother/Sister    Past Surgical History:  Past Surgical History:   Procedure Laterality Date    CHOLECYSTECTOMY      COLONOSCOPY      HYSTERECTOMY (CERVIX STATUS UNKNOWN)      SHOULDER SURGERY      2 on right 1 on left    SIGMOIDOSCOPY N/A 2019    SIGMOIDOSCOPY POLYP SNARE performed by Johanny Amin DO at [address] [place] [address] N/A 2019    EGD BIOPSY performed by Johanny Amin DO at [facility] ENDOSCOPY       Immunization History:   Immunization History   Administered Date(s) Administered    COVID-19, PFIZER PURPLE top, DILUTE for use, (age 15 y+), 30mcg/0.3mL 2021, 2021       Active Problems:  Patient Active Problem List   Diagnosis Code    GERD (gastroesophageal reflux disease) K21.9    Headache R51.9    Major depression in remission ([address] F32.5    HTN (hypertension), benign I10    Grief reaction F43.21    SIRS (systemic inflammatory response syndrome) ([facility]) R65.10    Acute tonsillitis J03.90    Pneumonia due to COVID-19 virus U07.1, J12.82    COVID-19 U07.1       Isolation/Infection:   Isolation            Droplet Plus  Droplet Plus          Patient Infection Status       Infection Onset Added Last Indicated Last Indicated By Review Planned Expiration Resolved Resolved By    COVID-19 07/08/22 07/08/22 07/08/22 COVID-19 & Influenza Combo 07/15/22 07/22/22      Resolved    COVID-19 (Rule Out) 07/08/22 07/08/22 07/08/22 COVID-19 & Influenza Combo (Ordered)   07/08/22 Rule-Out Test Resulted    COVID-19 (Rule Out) 07/04/22 07/04/22 07/04/22 COVID-19 & Influenza Combo (Ordered)   07/04/22 Rule-Out Test Resulted            Nurse Assessment:  Last Vital Signs: /66   Pulse 79   Temp 96.8 °F (36 °C) (Oral)   Resp 18   Ht 5' 4\" (1.626 m)   Wt 253 lb (114.8 kg)   SpO2 97%   BMI 43.43 kg/m²     Last documented pain score (0-10 scale): Pain Level: 4  Last Weight:   Wt Readings from Last 1 Encounters:   07/09/22 253 lb (114.8 kg)     Mental Status:  oriented and alert    IV Access:  - None    Nursing Mobility/ADLs:  Walking   Independent  Transfer  Independent  Bathing  Independent  Dressing  Independent  Toileting  Independent  Feeding  Independent  Med Admin  Independent  Med Delivery   none    Wound Care Documentation and Therapy:        Elimination:  Continence: Bowel: Yes  Bladder: Yes  Urinary Catheter: None   Colostomy/Ileostomy/Ileal Conduit: No       Date of Last BM: 7/7/22    Intake/Output Summary (Last 24 hours) at 7/11/2022 1415  Last data filed at 7/11/2022 0251  Gross per 24 hour   Intake 6585.15 ml   Output 1300 ml   Net 5285.15 ml     I/O last 3 completed shifts: In: 6945.2 [P.O.:1560; I.V.:4168.7; IV Piggyback:1216.5]  Out: 1300 [Urine:1300]    Safety Concerns: At Risk for Falls    Impairments/Disabilities:      None    Nutrition Therapy:  Current Nutrition Therapy:   - Oral Diet:  General    Routes of Feeding: Oral  Liquids: Thin Liquids  Daily Fluid Restriction: no  Last Modified Barium Swallow with Video (Video Swallowing Test): not done    Treatments at the Time of Hospital Discharge:   Respiratory Treatments: n/a  Oxygen Therapy:  is on oxygen at 1-2 L/min per nasal cannula.   Ventilator:    - No ventilator support    Rehab Therapies: Physical Therapy and Occupational Therapy  Weight Bearing Status/Restrictions: No weight bearing restrictions  Other Medical Equipment (for information only, NOT a DME order):  walker  Other Treatments: n/a    Patient's personal belongings (please select all that are sent with patient):  None    RN SIGNATURE:  Electronically signed by Jennifer Johnson RN on 7/11/22 at 2:32 PM EDT    CASE MANAGEMENT/SOCIAL WORK SECTION    Inpatient Status Date: 07/08/2022 - 07/11/2022    Readmission Risk Assessment Score:  Readmission Risk              Risk of Unplanned Readmission:  0           Discharging to Facility/ [address] [place] [address] [facility]  Phone:  [phone]  Fax: [phone]    [facility]  Phone: [phone]  Fax: [phone]          / signature: Electronically signed by Luis Manuel Lester RN on 7/11/22 at 2:15 PM EDT    PHYSICIAN SECTION    Prognosis: Good    Condition at Discharge: Stable    Rehab Potential (if transferring to Rehab): Good    Recommended Labs or Other Treatments After Discharge: *    Physician Certification: I certify the above information and transfer of Rebekah Wong  is necessary for the continuing treatment of the diagnosis listed and that she requires Home Care for less 30 days.      Update Admission H&P: No change in H&P    PHYSICIAN SIGNATURE:  Electronically signed by Maria Esther Wang MD on 7/11/22 at 2:40 PM EDT

## 2022-07-11 NOTE — PROGRESS NOTES
CLINICAL PHARMACY NOTE: MEDS TO BEDS    Total # of Prescriptions Filled: 1   The following medications were delivered to the patient:  · Dexamethasone 6 mg tabs    Additional Documentation:    Patient's nurse Keyshawn Palomino RN signed for her prescription

## 2022-07-12 ENCOUNTER — CARE COORDINATION (OUTPATIENT)
Dept: CASE MANAGEMENT | Age: 62
End: 2022-07-12

## 2022-07-12 LAB
CULTURE, RESPIRATORY: NORMAL
GRAM STAIN RESULT: NORMAL
L. PNEUMOPHILA SEROGP 1 UR AG: NORMAL

## 2022-07-12 NOTE — CARE COORDINATION
[facility] Transitions Initial Follow Up Call    Call within 2 business days of discharge: Yes    Patient: Dustin Guaman Patient : 1960   MRN: [id number]  Reason for Admission:   Discharge Date: 22 RARS: Readmission Risk Score: 10.5 ( )      Last Discharge  [place] [id number]       Complaint Diagnosis Description Type Department Provider    22 Shortness of Breath COVID . Tracy Reese . ED to Hosp-Admission (Discharged) (ADMITTED) [facility] 5T Joce Kelly MD; Juanjo Stephens. .. Spoke with: Dustin Guaman  Non-face-to-face services provided:  Obtained and reviewed discharge summary and/or continuity of care documents     Transitions of Care Initial Call    Was this an external facility discharge? No Discharge Facility:     Challenges to be reviewed by the provider   Additional needs identified to be addressed with provider: No  none             Method of communication with provider : none    Advance Care Planning:   Does patient have an Advance Directive: not on file; education provided. Care Transition Nurse contacted the patient by telephone to perform post hospital discharge assessment. Verified name and  with patient as identifiers. Provided introduction to self, and explanation of the CTN role. CTN reviewed discharge instructions, medical action plan and red flags with patient who verbalized understanding. Patient given an opportunity to ask questions and does not have any further questions or concerns at this time. Were discharge instructions available to patient? Yes. Reviewed appropriate site of care based on symptoms and resources available to patient including: When to call 911. The patient agrees to contact the PCP office for questions related to their healthcare. Medication reconciliation was performed with patient, who verbalizes understanding of administration of home medications. Advised obtaining a 90-day supply of all daily and as-needed medications.      Was patient discharged with a pulse oximeter? yes, discussed and confirmed pulse oximeter discharge instructions and when to notify provider or seek emergency care. Pt states doing much better. Denies CP, fever, is still SOB on exertion. Wearing her O2 at 2L, oximeter reading 95%. Pt states she has not heard from home care as yet, this nurse informed pt that a call will be made by this nurse to ensure that they have received a referral. She also stated that she did not receive a nebulizer for the albuterol  that was ordered, this nurse sent a message to the PA that ordered the albuterol through [employer] to see if she could write an order for one, awaiting a response. Encouraged pt to make f/u appt with PCP, voiced understanding. PC made to [facility] they had not receive the orders, this nurse faxed over the orders, agency will be reaching out to the pt. CTN provided contact information. No further follow-up call indicated based on severity of symptoms and risk factors. Plan for next call: n/a    Care Transitions 24 Hour Call    Do you have a copy of your discharge instructions?: Yes  Do you have all of your prescriptions and are they filled?: Yes  Have you been contacted by a [facility]?: No  Have you scheduled your follow up appointment?: No  Do you have support at home?: Partner/Spouse/SO  Do you feel like you have everything you need to keep you well at home?: No  Are you an active caregiver in your home?: No  Care Transitions Interventions  No Identified Needs         Follow Up  No future appointments.     Cory Hoang RN

## 2022-07-12 NOTE — CARE COORDINATION
[facility] Transitions Follow Up Call    2022    Patient: Joanie Hanson  Patient : 1960   MRN: [id number]  Reason for Admission:   Discharge Date: 22 RARS: Readmission Risk Score: 10.5 ( )       This nurse received message from [facility] stating that an order will be written for a nebulizer machine and will be called into pharmacy or DME company which ever pt's insurance covers. Pt updated via voice message. Follow Up  No future appointments.     Lupe Kent RN

## 2022-07-12 NOTE — CARE COORDINATION
HHN machine called into [address] [place] [address] [phone], left on voicemail. Spoke with patient to let her know and if there are any issues, patient was given the office number to call so a HHN can be arranged for  through [address] [place] [address].   Electronically signed by Heather Callaway RN on 7/12/2022 at 4:20 PM

## 2022-07-13 ENCOUNTER — CARE COORDINATION (OUTPATIENT)
Dept: CASE MANAGEMENT | Age: 62
End: 2022-07-13

## 2022-07-13 LAB
BLOOD CULTURE, ROUTINE: NORMAL
CULTURE, BLOOD 2: NORMAL

## 2022-07-16 NOTE — DISCHARGE SUMMARY
Hospital Medicine Discharge Summary    Patient ID: Dustin Guaman      Patient's PCP: Noe Haro MD    Admit Date: 7/8/2022     Discharge Date: 7/11/2022     Admitting Physician: Joce Kelly MD     Discharge Physician: Vicente Main MD        Active Hospital Problems    Diagnosis     COVID-19 [U07.1]      Priority: Medium    Pneumonia due to COVID-19 virus [U07.1, J12.82]      Priority: Medium         Hospital Course: This 69-year-old admitted to the hospital complaining of dyspnea       Acute hypoxic respiratory failure 2/2 COVID-19   On admission, patient tested positive for COVID-19. Patient was started on supplemental oxygen and IV steroids. Patient was weaned as tolerated down to 1 to 2 L nasal cannula and was discharged on steroid. Lactic acidosis suspect secondary to dehydration: Resolved with IV fluids      Obesity: Counseled on weight loss and healthy lifestyle modifications     Physical Exam Performed:     /66   Pulse 79   Temp 96.8 °F (36 °C) (Oral)   Resp 18   Ht 5' 4\" (1.626 m)   Wt 253 lb (114.8 kg)   SpO2 97%   BMI 43.43 kg/m²     General appearance:  No apparent distress, appears stated age and cooperative. Eyes: Sclera clear. Pupils equal.  ENT: Moist oral mucosa. Trachea midline, no adenopathy. Cardiovascular: Regular rhythm, normal S1, S2. No murmur. No edema in lower extremities  Respiratory:Not usingaccessory muscles. Good inspiratory effort. Clear to auscultation bilaterally  GI: Abdomen soft, no tenderness, not distended, normal bowel sounds  Musculoskeletal: Normal ROM, No cyanosis in digits. Neurology: CN 2-12 grossly intact. No speech or motor deficits  Psych: Normal affect.  Alert and oriented in time, place and person  Skin: Warm, dry, normal turgor       Consults:     IP CONSULT TO HOSPITALIST  IP CONSULT TO HOME CARE NEEDS    Disposition: Home    Condition at Discharge: Stable     Discharge Instructions/Follow-up:   Follow up with your PCP within 7-10 days of discharge. Quarantine yourself for total of 10 days since the start of symptoms   Follow up with pulmonology as instructed   Follow up with cardiology as instructed   Take all your medications as prescribed. Discharge Medications:     Discharge Medication List as of 7/11/2022  3:51 PM             Details   guaiFENesin-dextromethorphan (ROBITUSSIN DM) 100-10 MG/5ML syrup Take 5 mLs by mouth every 8 hours as needed for Cough, Disp-120 mL, R-0Normal      dexamethasone (DECADRON) 6 MG tablet Take 1 tablet by mouth daily (with breakfast) for 6 doses, Disp-6 tablet, R-0Normal                Details   Azilsartan Medoxomil (EDARBI) 40 MG TABS Take 40 mg by mouth dailyHistorical Med      furosemide (LASIX) 20 MG tablet Take 20 mg by mouth dailyHistorical Med      albuterol (PROVENTIL) (2.5 MG/3ML) 0.083% nebulizer solution Take 3 mLs by nebulization every 6 hours as needed for Wheezing, Disp-120 each, R-1Normal      benzonatate (TESSALON PERLES) 100 MG capsule Take 1 capsule by mouth 3 times daily as needed for Cough, Disp-30 capsule, R-0Normal      ferrous sulfate 325 (65 Fe) MG tablet Take 1 tablet by mouth 2 times daily, Disp-60 tablet, R-5NO PRINT      budesonide-formoterol (SYMBICORT) 160-4.5 MCG/ACT AERO Inhale 2 puffs into the lungs 2 times daily. , Disp-1 Inhaler, R-3               The patient was seen and examined on day of discharge and this discharge summary is in conjunction with any daily progress note from day of discharge. Time Spent on discharge is 45 minutes  in the examination, evaluation, counseling and review of medications and discharge plan. Note that greater  than 30 minutes was spent in preparing discharge papers, discussing discharge with patient, medication review, etc.     Signed:    Liyah Villegas MD   7/16/2022      Thank you Mickey Casas MD for the opportunity to be involved in this patient's care.  If you have any questions or concerns please feel free to

## (undated) DEVICE — SNARE ENDOSCP 11 MM BRAIDED WIRE CAPTFLX DISP

## (undated) DEVICE — FORCEPS BX 240CM 2.4MM L NDL RAD JAW 4 M00513334

## (undated) DEVICE — NEEDLE 25GAX5.0MM INJ CARR LOCKE